# Patient Record
Sex: FEMALE | Race: WHITE | NOT HISPANIC OR LATINO | Employment: UNEMPLOYED | URBAN - METROPOLITAN AREA
[De-identification: names, ages, dates, MRNs, and addresses within clinical notes are randomized per-mention and may not be internally consistent; named-entity substitution may affect disease eponyms.]

---

## 2021-04-05 ENCOUNTER — OFFICE VISIT (OUTPATIENT)
Dept: OBSTETRICS AND GYNECOLOGY | Age: 36
End: 2021-04-05

## 2021-04-05 VITALS
DIASTOLIC BLOOD PRESSURE: 78 MMHG | HEIGHT: 66 IN | SYSTOLIC BLOOD PRESSURE: 114 MMHG | BODY MASS INDEX: 43.39 KG/M2 | WEIGHT: 270 LBS

## 2021-04-05 DIAGNOSIS — Z01.419 WELL WOMAN EXAM WITH ROUTINE GYNECOLOGICAL EXAM: Primary | ICD-10-CM

## 2021-04-05 DIAGNOSIS — Z34.80 SUPERVISION OF OTHER NORMAL PREGNANCY, ANTEPARTUM: ICD-10-CM

## 2021-04-05 PROBLEM — A60.04 HERPES, VULVAR: Status: ACTIVE | Noted: 2021-04-05

## 2021-04-05 PROBLEM — Z98.891 PREVIOUS CESAREAN SECTION: Status: ACTIVE | Noted: 2021-04-05

## 2021-04-05 PROBLEM — Z86.19 HISTORY OF WOUND INFECTION: Status: ACTIVE | Noted: 2021-04-05

## 2021-04-05 PROBLEM — Z34.90 PREGNANCY: Status: ACTIVE | Noted: 2021-04-05

## 2021-04-05 PROBLEM — E66.9 OBESITY (BMI 30-39.9): Status: ACTIVE | Noted: 2021-04-05

## 2021-04-05 PROCEDURE — 99213 OFFICE O/P EST LOW 20 MIN: CPT | Performed by: NURSE PRACTITIONER

## 2021-04-05 PROCEDURE — 99385 PREV VISIT NEW AGE 18-39: CPT | Performed by: NURSE PRACTITIONER

## 2021-04-05 NOTE — PROGRESS NOTES
[facility]  Routine Annual Visit    2021    Patient: Saima Rausch          MR#:[id number]      History of Present Illness    35 y.o. female  who presents for annual exam and confirmation of pregnancy as an old/new patient.    US confirms viable IUP at 9w3d.  This is Saima's 3rd pregnancy- hx 2 c-sections  She is taking a prenatal vitamin- no other meds  No complaints today    Patient's last menstrual period was 2021.  Obstetric History:  OB History        2    Para   2    Term   2            AB        Living   2       SAB        TAB        Ectopic        Molar        Multiple        Live Births   2               Menstrual History:     Patient's last menstrual period was 2021.       Sexual History:       ________________________________________  Patient Active Problem List   Diagnosis   • Pregnancy   • Herpes, vulvar   • History of wound infection       Past Medical History:   Diagnosis Date   • HSV-2 (herpes simplex virus 2) infection    • Wound infection        Past Surgical History:   Procedure Laterality Date   •  SECTION     • CHOLECYSTECTOMY         Social History     Tobacco Use   Smoking Status Never Smoker   Smokeless Tobacco Never Used       Family History   Problem Relation Age of Onset   • Hypertension Father    • Hypertension Sister        Prior to Admission medications    Medication Sig Start Date End Date Taking? Authorizing Provider   bisacodyl (DULCOLAX) 5 MG EC tablet Take 5 mg by mouth Daily As Needed for Constipation.    Emergency, Nurse BOBBY Robles     ________________________________________    The following portions of the patient's history were reviewed and updated as appropriate: allergies, current medications, past family history, past medical history, past social history, past surgical history and problem list.    Review of Systems   Constitutional: Negative.    HENT: Negative.    Eyes: Negative for visual disturbance.  "  Respiratory: Negative for cough, shortness of breath and wheezing.    Cardiovascular: Negative for chest pain, palpitations and leg swelling.   Gastrointestinal: Negative for abdominal distention, abdominal pain, blood in stool, constipation, diarrhea, nausea and vomiting.   Endocrine: Negative for cold intolerance and heat intolerance.   Genitourinary: Negative for difficulty urinating, dyspareunia, dysuria, frequency, genital sores, hematuria, menstrual problem, pelvic pain, urgency, vaginal bleeding, vaginal discharge and vaginal pain.   Musculoskeletal: Negative.    Skin: Negative.    Neurological: Negative for dizziness, weakness, light-headedness, numbness and headaches.   Hematological: Negative.    Psychiatric/Behavioral: Negative.    Breasts: negative for lumps skin changes, dimpling, swelling, nipple changes/discharge bilaterally       Objective   Physical Exam    /78   Ht 167.6 cm (66\")   Wt 122 kg (270 lb)   LMP 01/29/2021   Breastfeeding No   BMI 43.58 kg/m²    BP Readings from Last 3 Encounters:   04/05/21 114/78   10/02/17 133/86      Wt Readings from Last 3 Encounters:   04/05/21 122 kg (270 lb)   10/02/17 118 kg (260 lb)        BMI: Estimated body mass index is 43.58 kg/m² as calculated from the following:    Height as of this encounter: 167.6 cm (66\").    Weight as of this encounter: 122 kg (270 lb).            General:   alert, appears stated age and cooperative   Heart: regular rate and rhythm, S1, S2 normal, no murmur, click, rub or gallop   Lungs: clear to auscultation bilaterally   Abdomen: soft, non-tender, without masses or organomegaly   Breast: inspection negative, no nipple discharge or bleeding, no masses or nodularity palpable   Vulva: External genitalia including bartholin's glands, Urethra, Drav[place]'s gland and urethra meatus are normal, Perineum, rectum and anus appear normal  and Bladder appears normal without significant prolapse    Vagina: normal mucosa, normal discharge "   Cervix: no cervical motion tenderness and no lesions   Uterus: deferred   Adnexa: deferred     Assessment:     Diagnoses and all orders for this visit:    1. Well woman exam with routine gynecological exam (Primary)  -     ABO / Rh  -     Antibody Screen  -     CBC (No Diff)  -     Hemoglobin A1c  -     Hemoglobinopathy Fractionation Cascade  -     Hepatitis B Surface Antigen  -     Varicella Zoster Antibody, IgG  -     Urine Culture - Urine, Urine, Clean Catch  -     Rubella Antibody, IgG  -     RPR  -     HIV-1 / O / 2 Ag / Antibody 4th Generation  -     Hepatitis C Antibody  -     TSH Rfx On Abnormal To Free T4  -     IgP, Aptima HPV  -     NuSwab VG+ - Swab, Vagina    2. Supervision of other normal pregnancy, antepartum  -     IgP, Aptima HPV  -     NuSwab VG+ - Swab, Vagina      Early pregnancy counseling provided   Patient is taking Prenatal vitamins  Problem list reviewed and updated  Reviewed routine prenatal care with the office to include but not limited to expected weight gain during pregnancy, Tylenol products are fine, avoid aspirin and ibuprofen; Zika (travel restrictions/ok to use insect repellant); not to change cat litter; food restrictions; avoidance of alcohol, tobacco, drugs and saunas/hot tubs.   All questions answered.  SAB warnings   Counseled on healthy lifestyle modifications, safe sex, condom use, and self breast exams.    RTO 4 weeks for OB intake    [place]iana Avila, APRN  4/5/2021 15:23 EDT

## 2021-04-07 LAB
BACTERIA UR CULT: NORMAL
BACTERIA UR CULT: NORMAL
CYTOLOGIST CVX/VAG CYTO: NORMAL
CYTOLOGY CVX/VAG DOC CYTO: NORMAL
CYTOLOGY CVX/VAG DOC THIN PREP: NORMAL
DX ICD CODE: NORMAL
HIV 1 & 2 AB SER-IMP: NORMAL
HPV I/H RISK 4 DNA CVX QL PROBE+SIG AMP: NEGATIVE
OTHER STN SPEC: NORMAL
STAT OF ADQ CVX/VAG CYTO-IMP: NORMAL

## 2021-04-08 LAB
A VAGINAE DNA VAG QL NAA+PROBE: NORMAL SCORE
BVAB2 DNA VAG QL NAA+PROBE: NORMAL SCORE
C ALBICANS DNA VAG QL NAA+PROBE: NEGATIVE
C GLABRATA DNA VAG QL NAA+PROBE: NEGATIVE
C TRACH DNA VAG QL NAA+PROBE: NEGATIVE
MEGA1 DNA VAG QL NAA+PROBE: NORMAL SCORE
N GONORRHOEA DNA VAG QL NAA+PROBE: NEGATIVE
T VAGINALIS DNA VAG QL NAA+PROBE: NEGATIVE

## 2021-04-09 LAB
ABO GROUP BLD: NORMAL
BLD GP AB SCN SERPL QL: NEGATIVE
ERYTHROCYTE [DISTWIDTH] IN BLOOD BY AUTOMATED COUNT: 14.6 % (ref 11.7–15.4)
HBA1C MFR BLD: 5.5 % (ref 4.8–5.6)
HBV SURFACE AG SERPL QL IA: NEGATIVE
HCT VFR BLD AUTO: 31.5 % (ref 34–46.6)
HCV AB S/CO SERPL IA: <0.1 S/CO RATIO (ref 0–0.9)
HGB A MFR BLD ELPH: 97.5 % (ref 96.4–98.8)
HGB A2 MFR BLD ELPH: 2.5 % (ref 1.8–3.2)
HGB BLD-MCNC: 10.5 G/DL (ref 11.1–15.9)
HGB F MFR BLD ELPH: 0 % (ref 0–2)
HGB FRACT BLD-IMP: NORMAL
HGB S MFR BLD ELPH: 0 %
HIV 1+2 AB+HIV1 P24 AG SERPL QL IA: NON REACTIVE
MCH RBC QN AUTO: 27.3 PG (ref 26.6–33)
MCHC RBC AUTO-ENTMCNC: 33.3 G/DL (ref 31.5–35.7)
MCV RBC AUTO: 82 FL (ref 79–97)
PLATELET # BLD AUTO: 304 X10E3/UL (ref 150–450)
RBC # BLD AUTO: 3.84 X10E6/UL (ref 3.77–5.28)
RH BLD: POSITIVE
RPR SER QL: NON REACTIVE
RUBV IGG SERPL IA-ACNC: 4.94 INDEX
TSH SERPL DL<=0.005 MIU/L-ACNC: 1.79 UIU/ML (ref 0.45–4.5)
VZV IGG SER IA-ACNC: 1102 INDEX
WBC # BLD AUTO: 7.3 X10E3/UL (ref 3.4–10.8)

## 2021-04-13 PROBLEM — O99.019 MATERNAL ANEMIA IN PREGNANCY, ANTEPARTUM: Status: ACTIVE | Noted: 2021-04-13

## 2021-04-13 RX ORDER — FERROUS SULFATE 325(65) MG
325 TABLET ORAL
Qty: 30 TABLET | Refills: 3 | Status: SHIPPED | OUTPATIENT
Start: 2021-04-13 | End: 2021-09-27 | Stop reason: SDUPTHER

## 2021-04-16 ENCOUNTER — BULK ORDERING (OUTPATIENT)
Dept: CASE MANAGEMENT | Facility: OTHER | Age: 36
End: 2021-04-16

## 2021-04-16 DIAGNOSIS — Z23 IMMUNIZATION DUE: ICD-10-CM

## 2021-05-05 ENCOUNTER — INITIAL PRENATAL (OUTPATIENT)
Dept: OBSTETRICS AND GYNECOLOGY | Age: 36
End: 2021-05-05

## 2021-05-05 VITALS — SYSTOLIC BLOOD PRESSURE: 124 MMHG | DIASTOLIC BLOOD PRESSURE: 72 MMHG | WEIGHT: 266 LBS | BODY MASS INDEX: 42.93 KG/M2

## 2021-05-05 DIAGNOSIS — Z98.891 PREVIOUS CESAREAN SECTION: ICD-10-CM

## 2021-05-05 DIAGNOSIS — Z3A.13 13 WEEKS GESTATION OF PREGNANCY: Primary | ICD-10-CM

## 2021-05-05 DIAGNOSIS — Z13.79 ENCOUNTER FOR GENETIC SCREENING FOR DOWN SYNDROME: ICD-10-CM

## 2021-05-05 DIAGNOSIS — O99.210 OBESITY IN PREGNANCY, ANTEPARTUM: ICD-10-CM

## 2021-05-05 DIAGNOSIS — Z13.89 SCREENING FOR BLOOD OR PROTEIN IN URINE: ICD-10-CM

## 2021-05-05 PROBLEM — E66.9 OBESITY (BMI 30-39.9): Status: RESOLVED | Noted: 2021-04-05 | Resolved: 2021-05-05

## 2021-05-05 LAB
BILIRUB BLD-MCNC: NEGATIVE MG/DL
EXTERNAL CYSTIC FIBROSIS: NEGATIVE
EXTERNAL NIPT: NORMAL
GLUCOSE UR STRIP-MCNC: NEGATIVE MG/DL
KETONES UR QL: NEGATIVE
LEUKOCYTE EST, POC: NEGATIVE
NITRITE UR-MCNC: NEGATIVE MG/ML
PH UR: 6 [PH] (ref 5–8)
PROT UR STRIP-MCNC: NEGATIVE MG/DL
RBC # UR STRIP: NEGATIVE /UL
SP GR UR: 1.02 (ref 1–1.03)
UROBILINOGEN UR QL: NORMAL

## 2021-05-05 PROCEDURE — 99213 OFFICE O/P EST LOW 20 MIN: CPT | Performed by: OBSTETRICS & GYNECOLOGY

## 2021-05-05 RX ORDER — PRENATAL VIT/IRON FUM/FOLIC AC 27MG-0.8MG
TABLET ORAL DAILY
COMMUNITY

## 2021-05-05 NOTE — PROGRESS NOTES
Chief Complaint   Patient presents with   • Routine Prenatal Visit     no cc; genetic testing        HPI: 35 y.o.  at 13w5d presents for routine OB check feeling well without complaints.  The patient desires to proceed with noninvasive prenatal testing.  The patient's had 2 prior  section and is asking about .  Patient is advised to practice does not recommend that.    Vitals:    21 1433   BP: 124/72   Weight: 121 kg (266 lb)       Review of systems:     Gen: negative  CV:     negative  GI: negative  :   negative  MS:    negative  Neuro: negative and denies headaches and visual changes   Pul: negative      A/P  1. Intrauterine pregnancy at 13w5d   2. Pregnancy Risk:  NORMAL        Diagnoses and all orders for this visit:    1. 13 weeks gestation of pregnancy (Primary)    2. Screening for blood or protein in urine  -     POC Urinalysis Dipstick    3. Previous  section    4. Obesity in pregnancy, antepartum    5. Encounter for genetic screening for Down Syndrome        Nutrition and weight gain were addressed.      -----------------------  PLAN:   Return in about 4 weeks (around 2021) for Anatomic survey and OB check.      Abdulkadir Thompson MD  2021 15:08 EDT

## 2021-05-17 PROBLEM — Z13.79 GENETIC SCREENING: Status: ACTIVE | Noted: 2021-05-17

## 2021-06-01 LAB — EXTERNAL NIPT: NORMAL

## 2021-06-10 ENCOUNTER — ROUTINE PRENATAL (OUTPATIENT)
Dept: OBSTETRICS AND GYNECOLOGY | Age: 36
End: 2021-06-10

## 2021-06-10 VITALS — WEIGHT: 265 LBS | BODY MASS INDEX: 42.77 KG/M2 | SYSTOLIC BLOOD PRESSURE: 110 MMHG | DIASTOLIC BLOOD PRESSURE: 72 MMHG

## 2021-06-10 DIAGNOSIS — Z13.89 SCREENING FOR HEMATURIA OR PROTEINURIA: ICD-10-CM

## 2021-06-10 DIAGNOSIS — Z36.89 ENCOUNTER FOR FETAL ANATOMIC SURVEY: ICD-10-CM

## 2021-06-10 DIAGNOSIS — Z3A.18 18 WEEKS GESTATION OF PREGNANCY: Primary | ICD-10-CM

## 2021-06-10 LAB
GLUCOSE UR STRIP-MCNC: NEGATIVE MG/DL
PROT UR STRIP-MCNC: NEGATIVE MG/DL

## 2021-06-10 PROCEDURE — 99213 OFFICE O/P EST LOW 20 MIN: CPT | Performed by: NURSE PRACTITIONER

## 2021-06-10 NOTE — PROGRESS NOTES
Chief Complaint   Patient presents with   • Routine Prenatal Visit     18w6d, no complaints        HPI: 35 y.o.  at 18w6d presents for routine OB visit without complaint    Anatomy scan today limited - repeat 4 weeks. CL 4.3 cm.  Counseled regarding AFP - declines    Vitals:    06/10/21 1012   BP: 110/72   Weight: 120 kg (265 lb)       Review of systems:     Gen: negative  CV:     negative  GI: negative  :   negative  MS:    negative  Neuro: negative  Pul: negative      A/P  1. Intrauterine pregnancy at 18w6d   2. Pregnancy Risk:  COMPLICATED        Diagnoses and all orders for this visit:    1. 18 weeks gestation of pregnancy (Primary)    2. Screening for hematuria or proteinuria  -     POC Urinalysis Dipstick    3. Encounter for fetal anatomic survey      ----------------------  PLAN:     RTO 4 weeks for repeat anatomy scan and OB visit  Call for changes or concerns         [place]iana Avila, BONG  6/10/2021 10:16 EDT

## 2021-07-07 ENCOUNTER — ROUTINE PRENATAL (OUTPATIENT)
Dept: OBSTETRICS AND GYNECOLOGY | Age: 36
End: 2021-07-07

## 2021-07-07 VITALS — WEIGHT: 269.6 LBS | SYSTOLIC BLOOD PRESSURE: 120 MMHG | BODY MASS INDEX: 43.51 KG/M2 | DIASTOLIC BLOOD PRESSURE: 82 MMHG

## 2021-07-07 DIAGNOSIS — Z13.89 SCREENING FOR HEMATURIA OR PROTEINURIA: ICD-10-CM

## 2021-07-07 DIAGNOSIS — Z36.89 ENCOUNTER FOR FETAL ANATOMIC SURVEY: ICD-10-CM

## 2021-07-07 DIAGNOSIS — Z3A.22 22 WEEKS GESTATION OF PREGNANCY: Primary | ICD-10-CM

## 2021-07-07 LAB
BILIRUB BLD-MCNC: NEGATIVE MG/DL
CLARITY, POC: CLEAR
COLOR UR: YELLOW
GLUCOSE UR STRIP-MCNC: NEGATIVE MG/DL
KETONES UR QL: NEGATIVE
LEUKOCYTE EST, POC: ABNORMAL
NITRITE UR-MCNC: NEGATIVE MG/ML
PH UR: 7 [PH] (ref 5–8)
PROT UR STRIP-MCNC: NEGATIVE MG/DL
RBC # UR STRIP: NEGATIVE /UL
SP GR UR: 1.01 (ref 1–1.03)
UROBILINOGEN UR QL: NORMAL

## 2021-07-07 PROCEDURE — 99213 OFFICE O/P EST LOW 20 MIN: CPT | Performed by: NURSE PRACTITIONER

## 2021-07-07 NOTE — PROGRESS NOTES
Chief Complaint   Patient presents with   • Routine Prenatal Visit     22w5d ob check with anatomy US today. No complaints today       HPI: 35 y.o.  at 22w5d presents for routine OB visit without complaint. Anatomy normal but incomplete today- will repeat 4 weeks. Growth normal- EFW 55%.     Vitals:    21 0901   BP: 120/82   Weight: 122 kg (269 lb 9.6 oz)       Review of systems:     Gen: negative  CV:     negative  GI: negative  :   good fetal movement noted   MS:    negative  Neuro: negative  Pul: negative      A/P  1. Intrauterine pregnancy at 22w5d   2. Pregnancy Risk:  NORMAL        Diagnoses and all orders for this visit:    1. 22 weeks gestation of pregnancy (Primary)    2. Screening for hematuria or proteinuria  -     POC Urinalysis Dipstick    3. Encounter for fetal anatomic survey        Nutrition and weight gain were addressed.      -----------------------  PLAN:   4 weeks repeat anatomy scan, 1 hr glucose, and OB visit  Call for changes or concerns    [place] Avila, APRN  2021 09:09 EDT

## 2021-08-08 ENCOUNTER — HOSPITAL ENCOUNTER (EMERGENCY)
Facility: HOSPITAL | Age: 36
Discharge: HOME | End: 2021-08-08
Attending: OBSTETRICS & GYNECOLOGY | Admitting: OBSTETRICS & GYNECOLOGY

## 2021-08-08 ENCOUNTER — APPOINTMENT (OUTPATIENT)
Dept: GENERAL RADIOLOGY | Facility: HOSPITAL | Age: 36
End: 2021-08-08

## 2021-08-08 VITALS
SYSTOLIC BLOOD PRESSURE: 114 MMHG | DIASTOLIC BLOOD PRESSURE: 74 MMHG | OXYGEN SATURATION: 98 % | HEIGHT: 66 IN | WEIGHT: 263.6 LBS | TEMPERATURE: 97.1 F | HEART RATE: 88 BPM | RESPIRATION RATE: 15 BRPM | BODY MASS INDEX: 42.36 KG/M2

## 2021-08-08 DIAGNOSIS — Z3A.27 27 WEEKS GESTATION OF PREGNANCY: ICD-10-CM

## 2021-08-08 DIAGNOSIS — R82.90 ABNORMAL URINALYSIS: ICD-10-CM

## 2021-08-08 DIAGNOSIS — U07.1 COVID-19: Primary | ICD-10-CM

## 2021-08-08 DIAGNOSIS — R06.00 DYSPNEA, UNSPECIFIED TYPE: ICD-10-CM

## 2021-08-08 LAB
ALBUMIN SERPL-MCNC: 3.4 G/DL (ref 3.5–5.2)
ALBUMIN/GLOB SERPL: 1 G/DL
ALP SERPL-CCNC: 196 U/L (ref 39–117)
ALT SERPL W P-5'-P-CCNC: 23 U/L (ref 1–33)
ANION GAP SERPL CALCULATED.3IONS-SCNC: 14.6 MMOL/L (ref 5–15)
AST SERPL-CCNC: 19 U/L (ref 1–32)
B PARAPERT DNA SPEC QL NAA+PROBE: NOT DETECTED
B PERT DNA SPEC QL NAA+PROBE: NOT DETECTED
BACTERIA UR QL AUTO: ABNORMAL /HPF
BASOPHILS # BLD AUTO: 0.01 10*3/MM3 (ref 0–0.2)
BASOPHILS NFR BLD AUTO: 0.2 % (ref 0–1.5)
BILIRUB SERPL-MCNC: 0.3 MG/DL (ref 0–1.2)
BILIRUB UR QL STRIP: NEGATIVE
BUN SERPL-MCNC: 5 MG/DL (ref 6–20)
BUN/CREAT SERPL: 8.8 (ref 7–25)
C PNEUM DNA NPH QL NAA+NON-PROBE: NOT DETECTED
CALCIUM SPEC-SCNC: 8.1 MG/DL (ref 8.6–10.5)
CHLORIDE SERPL-SCNC: 104 MMOL/L (ref 98–107)
CLARITY UR: ABNORMAL
CO2 SERPL-SCNC: 19.4 MMOL/L (ref 22–29)
COLOR UR: ABNORMAL
CREAT SERPL-MCNC: 0.57 MG/DL (ref 0.57–1)
DEPRECATED RDW RBC AUTO: 45.2 FL (ref 37–54)
EOSINOPHIL # BLD AUTO: 0.01 10*3/MM3 (ref 0–0.4)
EOSINOPHIL NFR BLD AUTO: 0.2 % (ref 0.3–6.2)
ERYTHROCYTE [DISTWIDTH] IN BLOOD BY AUTOMATED COUNT: 14.1 % (ref 12.3–15.4)
FLUAV SUBTYP SPEC NAA+PROBE: NOT DETECTED
FLUBV RNA ISLT QL NAA+PROBE: NOT DETECTED
GFR SERPL CREATININE-BSD FRML MDRD: 122 ML/MIN/1.73
GLOBULIN UR ELPH-MCNC: 3.3 GM/DL
GLUCOSE SERPL-MCNC: 75 MG/DL (ref 65–99)
GLUCOSE UR STRIP-MCNC: NEGATIVE MG/DL
HADV DNA SPEC NAA+PROBE: NOT DETECTED
HCOV 229E RNA SPEC QL NAA+PROBE: NOT DETECTED
HCOV HKU1 RNA SPEC QL NAA+PROBE: NOT DETECTED
HCOV NL63 RNA SPEC QL NAA+PROBE: NOT DETECTED
HCOV OC43 RNA SPEC QL NAA+PROBE: NOT DETECTED
HCT VFR BLD AUTO: 36.7 % (ref 34–46.6)
HGB BLD-MCNC: 12.3 G/DL (ref 12–15.9)
HGB UR QL STRIP.AUTO: NEGATIVE
HMPV RNA NPH QL NAA+NON-PROBE: NOT DETECTED
HPIV1 RNA SPEC QL NAA+PROBE: NOT DETECTED
HPIV2 RNA SPEC QL NAA+PROBE: NOT DETECTED
HPIV3 RNA NPH QL NAA+PROBE: NOT DETECTED
HPIV4 P GENE NPH QL NAA+PROBE: NOT DETECTED
HYALINE CASTS UR QL AUTO: ABNORMAL /LPF
IMM GRANULOCYTES # BLD AUTO: 0.02 10*3/MM3 (ref 0–0.05)
IMM GRANULOCYTES NFR BLD AUTO: 0.4 % (ref 0–0.5)
KETONES UR QL STRIP: ABNORMAL
LEUKOCYTE ESTERASE UR QL STRIP.AUTO: ABNORMAL
LYMPHOCYTES # BLD AUTO: 1.08 10*3/MM3 (ref 0.7–3.1)
LYMPHOCYTES NFR BLD AUTO: 19.3 % (ref 19.6–45.3)
M PNEUMO IGG SER IA-ACNC: NOT DETECTED
MAGNESIUM SERPL-MCNC: 2 MG/DL (ref 1.6–2.6)
MCH RBC QN AUTO: 29.4 PG (ref 26.6–33)
MCHC RBC AUTO-ENTMCNC: 33.5 G/DL (ref 31.5–35.7)
MCV RBC AUTO: 87.8 FL (ref 79–97)
MONOCYTES # BLD AUTO: 0.23 10*3/MM3 (ref 0.1–0.9)
MONOCYTES NFR BLD AUTO: 4.1 % (ref 5–12)
NEUTROPHILS NFR BLD AUTO: 4.25 10*3/MM3 (ref 1.7–7)
NEUTROPHILS NFR BLD AUTO: 75.8 % (ref 42.7–76)
NITRITE UR QL STRIP: NEGATIVE
NRBC BLD AUTO-RTO: 0 /100 WBC (ref 0–0.2)
NT-PROBNP SERPL-MCNC: 35.1 PG/ML (ref 0–450)
PH UR STRIP.AUTO: 6.5 [PH] (ref 5–8)
PLATELET # BLD AUTO: 153 10*3/MM3 (ref 140–450)
PMV BLD AUTO: 9.6 FL (ref 6–12)
POTASSIUM SERPL-SCNC: 3.3 MMOL/L (ref 3.5–5.2)
PROCALCITONIN SERPL-MCNC: 0.05 NG/ML (ref 0–0.25)
PROT SERPL-MCNC: 6.7 G/DL (ref 6–8.5)
PROT UR QL STRIP: ABNORMAL
QT INTERVAL: 367 MS
RBC # BLD AUTO: 4.18 10*6/MM3 (ref 3.77–5.28)
RBC # UR: ABNORMAL /HPF
REF LAB TEST METHOD: ABNORMAL
RHINOVIRUS RNA SPEC NAA+PROBE: NOT DETECTED
RSV RNA NPH QL NAA+NON-PROBE: NOT DETECTED
SARS-COV-2 RNA NPH QL NAA+NON-PROBE: DETECTED
SODIUM SERPL-SCNC: 138 MMOL/L (ref 136–145)
SP GR UR STRIP: 1.03 (ref 1–1.03)
SQUAMOUS #/AREA URNS HPF: ABNORMAL /HPF
TROPONIN T SERPL-MCNC: <0.01 NG/ML (ref 0–0.03)
UROBILINOGEN UR QL STRIP: ABNORMAL
WBC # BLD AUTO: 5.6 10*3/MM3 (ref 3.4–10.8)
WBC UR QL AUTO: ABNORMAL /HPF

## 2021-08-08 PROCEDURE — 83735 ASSAY OF MAGNESIUM: CPT | Performed by: EMERGENCY MEDICINE

## 2021-08-08 PROCEDURE — 0202U NFCT DS 22 TRGT SARS-COV-2: CPT | Performed by: EMERGENCY MEDICINE

## 2021-08-08 PROCEDURE — 81001 URINALYSIS AUTO W/SCOPE: CPT | Performed by: OBSTETRICS & GYNECOLOGY

## 2021-08-08 PROCEDURE — 93005 ELECTROCARDIOGRAM TRACING: CPT | Performed by: EMERGENCY MEDICINE

## 2021-08-08 PROCEDURE — 71045 X-RAY EXAM CHEST 1 VIEW: CPT

## 2021-08-08 PROCEDURE — 87086 URINE CULTURE/COLONY COUNT: CPT | Performed by: OBSTETRICS & GYNECOLOGY

## 2021-08-08 PROCEDURE — 84145 PROCALCITONIN (PCT): CPT | Performed by: EMERGENCY MEDICINE

## 2021-08-08 PROCEDURE — 59025 FETAL NON-STRESS TEST: CPT

## 2021-08-08 PROCEDURE — 99283 EMERGENCY DEPT VISIT LOW MDM: CPT | Performed by: OBSTETRICS & GYNECOLOGY

## 2021-08-08 PROCEDURE — 85025 COMPLETE CBC W/AUTO DIFF WBC: CPT | Performed by: EMERGENCY MEDICINE

## 2021-08-08 PROCEDURE — 36415 COLL VENOUS BLD VENIPUNCTURE: CPT

## 2021-08-08 PROCEDURE — 83880 ASSAY OF NATRIURETIC PEPTIDE: CPT | Performed by: EMERGENCY MEDICINE

## 2021-08-08 PROCEDURE — 80053 COMPREHEN METABOLIC PANEL: CPT | Performed by: EMERGENCY MEDICINE

## 2021-08-08 PROCEDURE — 93010 ELECTROCARDIOGRAM REPORT: CPT | Performed by: INTERNAL MEDICINE

## 2021-08-08 PROCEDURE — 84484 ASSAY OF TROPONIN QUANT: CPT | Performed by: EMERGENCY MEDICINE

## 2021-08-08 PROCEDURE — 99202 OFFICE O/P NEW SF 15 MIN: CPT | Performed by: OBSTETRICS & GYNECOLOGY

## 2021-08-08 PROCEDURE — 99284 EMERGENCY DEPT VISIT MOD MDM: CPT

## 2021-08-08 RX ORDER — NITROFURANTOIN 25; 75 MG/1; MG/1
100 CAPSULE ORAL ONCE
Status: COMPLETED | OUTPATIENT
Start: 2021-08-08 | End: 2021-08-08

## 2021-08-08 RX ORDER — POTASSIUM CHLORIDE 750 MG/1
40 TABLET, FILM COATED, EXTENDED RELEASE ORAL ONCE
Status: COMPLETED | OUTPATIENT
Start: 2021-08-08 | End: 2021-08-08

## 2021-08-08 RX ADMIN — POTASSIUM CHLORIDE 40 MEQ: 750 TABLET, EXTENDED RELEASE ORAL at 15:45

## 2021-08-08 RX ADMIN — NITROFURANTOIN MONOHYDRATE/MACROCRYSTALLINE 100 MG: 25; 75 CAPSULE ORAL at 15:45

## 2021-08-09 LAB — BACTERIA SPEC AEROBE CULT: NORMAL

## 2021-08-23 ENCOUNTER — ROUTINE PRENATAL (OUTPATIENT)
Dept: OBSTETRICS AND GYNECOLOGY | Age: 36
End: 2021-08-23

## 2021-08-23 VITALS — BODY MASS INDEX: 44.55 KG/M2 | SYSTOLIC BLOOD PRESSURE: 118 MMHG | WEIGHT: 276 LBS | DIASTOLIC BLOOD PRESSURE: 74 MMHG

## 2021-08-23 DIAGNOSIS — Z36.89 ENCOUNTER FOR FETAL ANATOMIC SURVEY: Primary | ICD-10-CM

## 2021-08-23 DIAGNOSIS — O99.019 MATERNAL ANEMIA IN PREGNANCY, ANTEPARTUM: ICD-10-CM

## 2021-08-23 DIAGNOSIS — Z13.89 SCREENING FOR BLOOD OR PROTEIN IN URINE: Primary | ICD-10-CM

## 2021-08-23 DIAGNOSIS — Z98.891 PREVIOUS CESAREAN SECTION: ICD-10-CM

## 2021-08-23 DIAGNOSIS — Z36.89 ENCOUNTER FOR FETAL ANATOMIC SURVEY: ICD-10-CM

## 2021-08-23 DIAGNOSIS — Z13.1 SCREENING FOR DIABETES MELLITUS: ICD-10-CM

## 2021-08-23 DIAGNOSIS — Z23 NEED FOR TDAP VACCINATION: ICD-10-CM

## 2021-08-23 DIAGNOSIS — A60.04 HERPES, VULVAR: ICD-10-CM

## 2021-08-23 DIAGNOSIS — Z3A.29 29 WEEKS GESTATION OF PREGNANCY: ICD-10-CM

## 2021-08-23 DIAGNOSIS — O99.210 OBESITY IN PREGNANCY, ANTEPARTUM: ICD-10-CM

## 2021-08-23 PROBLEM — O98.513 COVID-19 AFFECTING PREGNANCY IN THIRD TRIMESTER: Status: ACTIVE | Noted: 2021-08-08

## 2021-08-23 PROBLEM — Z30.2 REQUEST FOR STERILIZATION: Status: ACTIVE | Noted: 2021-08-23

## 2021-08-23 PROBLEM — U07.1 COVID-19 AFFECTING PREGNANCY IN THIRD TRIMESTER: Status: ACTIVE | Noted: 2021-08-08

## 2021-08-23 LAB
BILIRUB BLD-MCNC: NEGATIVE MG/DL
CLARITY, POC: CLEAR
COLOR UR: YELLOW
GLUCOSE UR STRIP-MCNC: NEGATIVE MG/DL
KETONES UR QL: NEGATIVE
LEUKOCYTE EST, POC: NEGATIVE
NITRITE UR-MCNC: NEGATIVE MG/ML
PH UR: 6.5 [PH] (ref 5–8)
PROT UR STRIP-MCNC: NEGATIVE MG/DL
RBC # UR STRIP: NEGATIVE /UL
SP GR UR: 1 (ref 1–1.03)
UROBILINOGEN UR QL: NORMAL

## 2021-08-23 PROCEDURE — 90471 IMMUNIZATION ADMIN: CPT | Performed by: PHYSICIAN ASSISTANT

## 2021-08-23 PROCEDURE — 99214 OFFICE O/P EST MOD 30 MIN: CPT | Performed by: PHYSICIAN ASSISTANT

## 2021-08-23 PROCEDURE — 90715 TDAP VACCINE 7 YRS/> IM: CPT | Performed by: PHYSICIAN ASSISTANT

## 2021-08-23 NOTE — PROGRESS NOTES
Chief Complaint   Patient presents with   • Routine Prenatal Visit     one hour gtt/repeat anatomy       HPI: 35 y.o.  at 29w3d gestation  She is here for rpt anatomy scan and 1 hour gtt  She was seen recently in ER and diagnosed with Covid (21)  She is feeling fine now, has no c/o other then urinary frequency  Has good FM  Additional anatomy scan done today, however, suboptimal view of 4 chamber heart noted  Will check with Dr Thompson in regards to additional imaging and if referral is warranted for the additional imaging  tdap given today  Pt interested in TL, will sign PW (surgery scheduler not in office, may need to sign at next visit)      Vitals:    21 1010   BP: 118/74   Weight: 125 kg (276 lb)       ROS:  GI:  Negative  : na  Pulmonary: Negative     A/P  1. Intrauterine pregnancy at 29w3d   2. Pregnancy Risk:  COMPLICATED    Diagnoses and all orders for this visit:    1. Screening for blood or protein in urine (Primary)  -     POC Urinalysis Dipstick    2. 29 weeks gestation of pregnancy  -     POC Urinalysis Dipstick  -     Gestational Screen 1 Hr ([facility])  -     Hemoglobin & Hematocrit, Blood    3. Screening for diabetes mellitus  -     Gestational Screen 1 Hr ([facility])  -     Hemoglobin & Hematocrit, Blood    4. Previous  section    5. Obesity in pregnancy, antepartum    6. Need for Tdap vaccination    Other orders  -     Tdap Vaccine Greater Than or Equal To 6yo IM        -----------------------  PLAN:   Return in about 3 weeks (around 2021) for belly check.      DELIA Álvarez  2021 10:37 EDT

## 2021-08-24 ENCOUNTER — TELEPHONE (OUTPATIENT)
Dept: OBSTETRICS AND GYNECOLOGY | Age: 36
End: 2021-08-24

## 2021-08-24 DIAGNOSIS — O99.810 ABNORMAL GLUCOSE AFFECTING PREGNANCY: Primary | ICD-10-CM

## 2021-08-24 LAB
GLUCOSE 1H P 50 G GLC PO SERPL-MCNC: 145 MG/DL (ref 65–139)
HCT VFR BLD AUTO: 31.8 % (ref 34–46.6)
HGB BLD-MCNC: 10.5 G/DL (ref 11.1–15.9)

## 2021-08-24 NOTE — TELEPHONE ENCOUNTER
----- Message from DELIA Darby sent at 8/24/2021 12:05 PM EDT -----  Pt needs a 3 hour gtt. She is also anemic, would suggest adding an iron supplement and increasing dietary intake of iron rich foods as well (beans, lean red meat and green, leafy vegetables)

## 2021-08-30 ENCOUNTER — LAB (OUTPATIENT)
Dept: OBSTETRICS AND GYNECOLOGY | Age: 36
End: 2021-08-30

## 2021-08-31 LAB
GLUCOSE 1H P 100 G GLC PO SERPL-MCNC: 146 MG/DL (ref 65–179)
GLUCOSE 2H P 100 G GLC PO SERPL-MCNC: 111 MG/DL (ref 65–154)
GLUCOSE 3H P 100 G GLC PO SERPL-MCNC: 109 MG/DL (ref 65–139)
GLUCOSE P FAST SERPL-MCNC: 83 MG/DL (ref 65–94)
Lab: NORMAL

## 2021-09-02 ENCOUNTER — OFFICE VISIT (OUTPATIENT)
Dept: OBSTETRICS AND GYNECOLOGY | Facility: CLINIC | Age: 36
End: 2021-09-02

## 2021-09-02 ENCOUNTER — HOSPITAL ENCOUNTER (OUTPATIENT)
Dept: ULTRASOUND IMAGING | Facility: HOSPITAL | Age: 36
Discharge: HOME | End: 2021-09-02
Admitting: PHYSICIAN ASSISTANT

## 2021-09-02 VITALS
BODY MASS INDEX: 43.71 KG/M2 | SYSTOLIC BLOOD PRESSURE: 123 MMHG | HEART RATE: 97 BPM | WEIGHT: 272 LBS | TEMPERATURE: 97.5 F | HEIGHT: 66 IN | DIASTOLIC BLOOD PRESSURE: 79 MMHG

## 2021-09-02 DIAGNOSIS — O99.210 OBESITY IN PREGNANCY, ANTEPARTUM: Primary | ICD-10-CM

## 2021-09-02 PROCEDURE — 76811 OB US DETAILED SNGL FETUS: CPT | Performed by: OBSTETRICS & GYNECOLOGY

## 2021-09-02 PROCEDURE — 76811 OB US DETAILED SNGL FETUS: CPT

## 2021-09-02 NOTE — PROGRESS NOTES
"Anatomy    Saima Rausch is a 35 y.o.   at 30w6d who presents for anatomy screening.  OB History    Para Term  AB Living   3 2 2     2   SAB TAB Ectopic Molar Multiple Live Births             2      # Outcome Date GA Lbr Ronnie/2nd Weight Sex Delivery Anes PTL Lv   3 Current            2 Term 10/09/14 39w0d  3130 g (6 lb 14.4 oz) M CS-LTranv Spinal  NELSON   1 Term 2013 38w6d  3260 g (7 lb 3 oz) F CS-LTranv Spinal  NELSON      Obstetric Comments   2021 9w3d Dating US:  Estimated Date of Delivery: 21 based on US hb    2021 19w0d Normal but INCOMPLETE anatomic survey, male fetus, CL=4.13 cm, placenta Posterior hb    2021 22w6d normal interval growth: EFW 55% AC 56%, survey remains incomplete hb      Patient Active Problem List   Diagnosis   • Pregnancy   • Herpes, vulvar   • History of wound infection   • Previous  section   • Maternal anemia in pregnancy, antepartum   • Obesity in pregnancy, antepartum   • Genetic screening   • Encounter for fetal anatomic survey   • Request for sterilization   • COVID-19 affecting pregnancy in third trimester     PHYSICAL EXAM:  /79   Pulse 97   Temp 97.5 °F (36.4 °C)   Ht 167.6 cm (66\")   Wt 123 kg (272 lb)   LMP 2021   BMI 43.90 kg/m²   General: pleasant, alert and oriented  Abdomen: soft, non tender, gravid  Extremites: bilat lower extremities soft, non tender, no edema noted    No abnormalities seen on today's ultrasound. Appropriate growth.  Visualization of fetal anatomy was limited due to the late gestational age and maternal body habitus.  Above information explained to the patient. Questions answered.    Follow up as clinically indicated.    Again thank you for requesting a MFM consult.  If we can be of any further assistance to you, please do not hesitate to contact us.    Barbara Lujan MD  [facility]ER[facility] MEDICINE              VISIT SYNOPSIS:    ASSESSMENT/PLAN:  MS. Saima Rausch is a 36 y.o.  at " 35w0d with the following visit diagnosis    Diagnoses and all orders for this visit:    1. Obesity in pregnancy, antepartum (Primary)        The above diagnosis have been evaluated and determined to be stable    This note has been routed to Dr. Thompson     At the end of this consultation all patient questions were answered, concerns addressed, and comprehensive management plan and follow up reviewed with patient.    I spent 15 minutes caring for Saima on this date of service. This time includes time spent by me in the following activities: reviewing tests, obtaining and/or reviewing a separately obtained history, performing a medically appropriate examination and/or evaluation, counseling and educating the patient/family/caregiver and independently interpreting results and communicating that information with the patient/family/caregiver.

## 2021-09-15 ENCOUNTER — ROUTINE PRENATAL (OUTPATIENT)
Dept: OBSTETRICS AND GYNECOLOGY | Age: 36
End: 2021-09-15

## 2021-09-15 VITALS — SYSTOLIC BLOOD PRESSURE: 118 MMHG | DIASTOLIC BLOOD PRESSURE: 78 MMHG | BODY MASS INDEX: 44.55 KG/M2 | WEIGHT: 276 LBS

## 2021-09-15 DIAGNOSIS — Z13.89 SCREENING FOR BLOOD OR PROTEIN IN URINE: ICD-10-CM

## 2021-09-15 DIAGNOSIS — O99.019 MATERNAL ANEMIA IN PREGNANCY, ANTEPARTUM: ICD-10-CM

## 2021-09-15 DIAGNOSIS — Z98.891 PREVIOUS CESAREAN SECTION: ICD-10-CM

## 2021-09-15 DIAGNOSIS — Z3A.32 32 WEEKS GESTATION OF PREGNANCY: Primary | ICD-10-CM

## 2021-09-15 DIAGNOSIS — O99.210 OBESITY IN PREGNANCY, ANTEPARTUM: ICD-10-CM

## 2021-09-15 LAB
GLUCOSE UR STRIP-MCNC: NEGATIVE MG/DL
PROT UR STRIP-MCNC: NEGATIVE MG/DL

## 2021-09-15 PROCEDURE — 99212 OFFICE O/P EST SF 10 MIN: CPT | Performed by: NURSE PRACTITIONER

## 2021-09-15 NOTE — PROGRESS NOTES
Pt doing well  Was seen by MFM on 9/2 and had normal growth u/s   Reports good fetal movement  Denies LOF, bleeding or ctx's  She is taking daily iron supplement  She has no concerns or complaints today  Will be repeat C/S with BTL - consent signed last visit  PTL/FKC discussed  F/u 2 weeks

## 2021-09-27 ENCOUNTER — ROUTINE PRENATAL (OUTPATIENT)
Dept: OBSTETRICS AND GYNECOLOGY | Age: 36
End: 2021-09-27

## 2021-09-27 VITALS — DIASTOLIC BLOOD PRESSURE: 78 MMHG | SYSTOLIC BLOOD PRESSURE: 118 MMHG | WEIGHT: 278 LBS | BODY MASS INDEX: 44.87 KG/M2

## 2021-09-27 DIAGNOSIS — Z13.89 SCREENING FOR BLOOD OR PROTEIN IN URINE: ICD-10-CM

## 2021-09-27 DIAGNOSIS — Z3A.34 34 WEEKS GESTATION OF PREGNANCY: Primary | ICD-10-CM

## 2021-09-27 DIAGNOSIS — Z98.891 PREVIOUS CESAREAN SECTION: ICD-10-CM

## 2021-09-27 DIAGNOSIS — O99.019 MATERNAL ANEMIA IN PREGNANCY, ANTEPARTUM: ICD-10-CM

## 2021-09-27 DIAGNOSIS — O99.210 OBESITY IN PREGNANCY, ANTEPARTUM: ICD-10-CM

## 2021-09-27 LAB
GLUCOSE UR STRIP-MCNC: NEGATIVE MG/DL
PROT UR STRIP-MCNC: NEGATIVE MG/DL

## 2021-09-27 PROCEDURE — 99214 OFFICE O/P EST MOD 30 MIN: CPT | Performed by: NURSE PRACTITIONER

## 2021-09-27 RX ORDER — FERROUS SULFATE 325(65) MG
325 TABLET ORAL
Qty: 30 TABLET | Refills: 3 | Status: SHIPPED | OUTPATIENT
Start: 2021-09-27

## 2021-09-27 NOTE — PROGRESS NOTES
Pt doing well  Reports good fetal movement  Denies LOF, bleeding or ctx's  Having some reflux  Taking iron - needs refilled  No other c/o's  PTL/FKC discussed  F/u 1 week with Dr. Thompson

## 2021-10-04 ENCOUNTER — ROUTINE PRENATAL (OUTPATIENT)
Dept: OBSTETRICS AND GYNECOLOGY | Age: 36
End: 2021-10-04

## 2021-10-04 VITALS — WEIGHT: 280.2 LBS | DIASTOLIC BLOOD PRESSURE: 74 MMHG | SYSTOLIC BLOOD PRESSURE: 122 MMHG | BODY MASS INDEX: 45.23 KG/M2

## 2021-10-04 DIAGNOSIS — Z30.2 REQUEST FOR STERILIZATION: ICD-10-CM

## 2021-10-04 DIAGNOSIS — U07.1 COVID-19 AFFECTING PREGNANCY IN THIRD TRIMESTER: ICD-10-CM

## 2021-10-04 DIAGNOSIS — Z36.89 ENCOUNTER FOR FETAL ANATOMIC SURVEY: ICD-10-CM

## 2021-10-04 DIAGNOSIS — Z98.891 PREVIOUS CESAREAN SECTION: ICD-10-CM

## 2021-10-04 DIAGNOSIS — O99.210 OBESITY IN PREGNANCY, ANTEPARTUM: ICD-10-CM

## 2021-10-04 DIAGNOSIS — Z13.89 SCREENING FOR BLOOD OR PROTEIN IN URINE: ICD-10-CM

## 2021-10-04 DIAGNOSIS — O98.513 COVID-19 AFFECTING PREGNANCY IN THIRD TRIMESTER: ICD-10-CM

## 2021-10-04 DIAGNOSIS — Z3A.35 35 WEEKS GESTATION OF PREGNANCY: Primary | ICD-10-CM

## 2021-10-04 DIAGNOSIS — O99.019 MATERNAL ANEMIA IN PREGNANCY, ANTEPARTUM: ICD-10-CM

## 2021-10-04 LAB
BILIRUB BLD-MCNC: ABNORMAL MG/DL
CLARITY, POC: CLEAR
COLOR UR: YELLOW
GLUCOSE UR STRIP-MCNC: NEGATIVE MG/DL
KETONES UR QL: NEGATIVE
LEUKOCYTE EST, POC: NEGATIVE
NITRITE UR-MCNC: NEGATIVE MG/ML
PH UR: 6.5 [PH] (ref 5–8)
PROT UR STRIP-MCNC: ABNORMAL MG/DL
RBC # UR STRIP: NEGATIVE /UL
SP GR UR: 1.02 (ref 1–1.03)
UROBILINOGEN UR QL: ABNORMAL

## 2021-10-04 PROCEDURE — 0502F SUBSEQUENT PRENATAL CARE: CPT | Performed by: OBSTETRICS & GYNECOLOGY

## 2021-10-04 NOTE — PROGRESS NOTES
Chief Complaint   Patient presents with   • Routine Prenatal Visit     35w3d- no leakage or bleeding,good FM        HPI: 36 y.o.  at 35w3d presents for routine OB check feeling well without major complaints.  She reports the fetus is exceptionally active.  She has expected intermittent moderate low back pain from the pregnancy.    The third trimester was complicated by Covid 19 with an uneventful uncomplicated course with complete recovery    Vitals:    10/04/21 0949   BP: 122/74   Weight: 127 kg (280 lb 3.2 oz)       Review of systems:     Gen: fatigue  CV:     negative  GI: negative  :   negative and good fetal movement noted   MS:    back pain   Neuro: negative and denies headaches and visual changes   Pul: negative      A/P  1. Intrauterine pregnancy at 35w3d   2. Pregnancy Risk:  HIGH RISK        Diagnoses and all orders for this visit:    1. 35 weeks gestation of pregnancy (Primary)    2. Screening for blood or protein in urine  -     POC Urinalysis Dipstick    3. COVID-19 affecting pregnancy in third trimester    4. Incomplete anatomic survey     5. Maternal anemia in pregnancy, antepartum    6. Obesity in pregnancy, antepartum    7. Previous  section  -     Case Request    8. Request for sterilization  -     Case Request        Pre-eclampsia symptoms were discussed and warnings were given.   labor was discussed.  Warnings were provided.  Detailed instructions for  were provided      -----------------------  PLAN:   Return in about 1 week (around 10/11/2021) for ob check.      Abdulkadir Thompson MD  10/4/2021 10:07 EDT

## 2021-10-13 ENCOUNTER — ROUTINE PRENATAL (OUTPATIENT)
Dept: OBSTETRICS AND GYNECOLOGY | Age: 36
End: 2021-10-13

## 2021-10-13 VITALS — BODY MASS INDEX: 45.35 KG/M2 | WEIGHT: 281 LBS | DIASTOLIC BLOOD PRESSURE: 72 MMHG | SYSTOLIC BLOOD PRESSURE: 124 MMHG

## 2021-10-13 DIAGNOSIS — Z86.19 HISTORY OF WOUND INFECTION: ICD-10-CM

## 2021-10-13 DIAGNOSIS — Z3A.36 36 WEEKS GESTATION OF PREGNANCY: Primary | ICD-10-CM

## 2021-10-13 DIAGNOSIS — O99.210 OBESITY IN PREGNANCY, ANTEPARTUM: ICD-10-CM

## 2021-10-13 DIAGNOSIS — U07.1 COVID-19 AFFECTING PREGNANCY IN THIRD TRIMESTER: ICD-10-CM

## 2021-10-13 DIAGNOSIS — Z98.891 PREVIOUS CESAREAN SECTION: ICD-10-CM

## 2021-10-13 DIAGNOSIS — Z13.89 SCREENING FOR BLOOD OR PROTEIN IN URINE: ICD-10-CM

## 2021-10-13 DIAGNOSIS — A60.04 HERPES, VULVAR: ICD-10-CM

## 2021-10-13 DIAGNOSIS — Z36.85 ANTENATAL SCREENING FOR STREPTOCOCCUS B: ICD-10-CM

## 2021-10-13 DIAGNOSIS — O98.513 COVID-19 AFFECTING PREGNANCY IN THIRD TRIMESTER: ICD-10-CM

## 2021-10-13 LAB
BILIRUB BLD-MCNC: NEGATIVE MG/DL
CLARITY, POC: CLEAR
COLOR UR: YELLOW
GLUCOSE UR STRIP-MCNC: NEGATIVE MG/DL
KETONES UR QL: NEGATIVE
LEUKOCYTE EST, POC: ABNORMAL
NITRITE UR-MCNC: NEGATIVE MG/ML
PH UR: 7 [PH] (ref 5–8)
PROT UR STRIP-MCNC: NEGATIVE MG/DL
RBC # UR STRIP: NEGATIVE /UL
SP GR UR: 1.02 (ref 1–1.03)
UROBILINOGEN UR QL: NORMAL

## 2021-10-13 PROCEDURE — 0502F SUBSEQUENT PRENATAL CARE: CPT | Performed by: OBSTETRICS & GYNECOLOGY

## 2021-10-13 NOTE — PROGRESS NOTES
Chief Complaint   Patient presents with   • Routine Prenatal Visit     refuses flu; pt c/o abdominal tightening and fetal hiccups a lot the last few days, last night they were so strong it woke her up        HPI: 36 y.o.  at 36w5d presents for routine check with complaint of an exceptionally active fetus but often has hiccups.  She had some mild abdominal tightening that may have been contractions but the symptoms have abated.    Vitals:    10/13/21 1057   BP: 124/72   Weight: 127 kg (281 lb)     Total weight gain for pregnancy:  Not found.    Review of systems:     Gen: fatigue  CV:     negative  GI: negative  :   good fetal movement noted  and pelvic cramping  MS:    back pain   Neuro: negative and denies headaches and visual changes   Pul: negative      A/P  1. Intrauterine pregnancy at 36w5d   2. Pregnancy Risk:  HIGH RISK        Diagnoses and all orders for this visit:    1. 36 weeks gestation of pregnancy (Primary)    2. Screening for blood or protein in urine  -     POC Urinalysis Dipstick    3. Obesity in pregnancy, antepartum    4. Previous  section    5. History of wound infection    6. Herpes, vulvar    7. COVID-19 affecting pregnancy in third trimester        Pre-eclampsia symptoms were discussed and warnings were given.  Routine labor warnings were discussed and indications for L & D f/u including bleeding, regular contractions, decreased fetal movement or/and rupture of membranes.   Detailed instructions for  were provided      -----------------------  PLAN:   Return in about 1 week (around 10/20/2021) for ob check.      Abdulkadir Thompson MD  10/13/2021 11:22 EDT

## 2021-10-15 LAB — GP B STREP DNA SPEC QL NAA+PROBE: NEGATIVE

## 2021-10-21 ENCOUNTER — ROUTINE PRENATAL (OUTPATIENT)
Dept: OBSTETRICS AND GYNECOLOGY | Age: 36
End: 2021-10-21

## 2021-10-21 VITALS — SYSTOLIC BLOOD PRESSURE: 120 MMHG | WEIGHT: 284 LBS | DIASTOLIC BLOOD PRESSURE: 72 MMHG | BODY MASS INDEX: 45.84 KG/M2

## 2021-10-21 DIAGNOSIS — Z13.89 SCREENING FOR BLOOD OR PROTEIN IN URINE: ICD-10-CM

## 2021-10-21 DIAGNOSIS — O98.513 COVID-19 AFFECTING PREGNANCY IN THIRD TRIMESTER: ICD-10-CM

## 2021-10-21 DIAGNOSIS — O99.210 OBESITY IN PREGNANCY, ANTEPARTUM: ICD-10-CM

## 2021-10-21 DIAGNOSIS — O98.513 HERPES SIMPLEX VIRUS TYPE 2 (HSV-2) INFECTION AFFECTING PREGNANCY IN THIRD TRIMESTER: ICD-10-CM

## 2021-10-21 DIAGNOSIS — Z34.80 SUPERVISION OF OTHER NORMAL PREGNANCY: ICD-10-CM

## 2021-10-21 DIAGNOSIS — B00.9 HERPES SIMPLEX VIRUS TYPE 2 (HSV-2) INFECTION AFFECTING PREGNANCY IN THIRD TRIMESTER: ICD-10-CM

## 2021-10-21 DIAGNOSIS — Z98.891 PREVIOUS CESAREAN SECTION: ICD-10-CM

## 2021-10-21 DIAGNOSIS — O99.019 MATERNAL ANEMIA IN PREGNANCY, ANTEPARTUM: ICD-10-CM

## 2021-10-21 DIAGNOSIS — Z30.2 REQUEST FOR STERILIZATION: ICD-10-CM

## 2021-10-21 DIAGNOSIS — Z3A.37 37 WEEKS GESTATION OF PREGNANCY: Primary | ICD-10-CM

## 2021-10-21 DIAGNOSIS — U07.1 COVID-19 AFFECTING PREGNANCY IN THIRD TRIMESTER: ICD-10-CM

## 2021-10-21 PROCEDURE — 0502F SUBSEQUENT PRENATAL CARE: CPT | Performed by: OBSTETRICS & GYNECOLOGY

## 2021-10-21 RX ORDER — VALACYCLOVIR HYDROCHLORIDE 1 G/1
1000 TABLET, FILM COATED ORAL DAILY
Qty: 30 TABLET | Refills: 4 | Status: SHIPPED | OUTPATIENT
Start: 2021-10-21

## 2021-10-21 NOTE — PROGRESS NOTES
Chief Complaint   Patient presents with   • Routine Prenatal Visit     no cc        HPI: 36 y.o.  at 37w6d presents feeling well without major complaints.  The patient reports good fetal movement.    Vitals:    10/21/21 0758   BP: 120/72   Weight: 129 kg (284 lb)     Total weight gain for pregnancy:  10.8 kg (23 lb 13.7 oz)    Review of systems:     Gen: fatigue  CV:     negative  GI: negative  :   negative and good fetal movement noted   MS:    back pain   Neuro: negative and denies headaches and visual changes   Pul: negative      A/P  1. Intrauterine pregnancy at 37w6d   2. Pregnancy Risk:  HIGH RISK        Diagnoses and all orders for this visit:    1. 37 weeks gestation of pregnancy (Primary)    2. Screening for blood or protein in urine  -     POC Urinalysis Dipstick    3. Supervision of other normal pregnancy    4. COVID-19 affecting pregnancy in third trimester    5. Maternal anemia in pregnancy, antepartum    6. Obesity in pregnancy, antepartum    7. Previous  section    8. Request for sterilization    9. Herpes simplex virus type 2 (HSV-2) infection affecting pregnancy in third trimester  -     valACYclovir (Valtrex) 1000 MG tablet; Take 1 tablet by mouth Daily.  Dispense: 30 tablet; Refill: 4        Pre-eclampsia symptoms were discussed and warnings were given.  Routine labor warnings were discussed and indications for L & D f/u including bleeding, regular contractions, decreased fetal movement or/and rupture of membranes.   Detailed instructions for  were provided      -----------------------  PLAN:   Return in about 1 week (around 10/28/2021) for OB check and BPP.      Abdulkadir Thompson MD  10/21/2021 08:20 EDT

## 2021-10-27 ENCOUNTER — ROUTINE PRENATAL (OUTPATIENT)
Dept: OBSTETRICS AND GYNECOLOGY | Age: 36
End: 2021-10-27

## 2021-10-27 DIAGNOSIS — Z30.2 REQUEST FOR STERILIZATION: ICD-10-CM

## 2021-10-27 DIAGNOSIS — O98.513 HERPES SIMPLEX VIRUS TYPE 2 (HSV-2) INFECTION AFFECTING PREGNANCY IN THIRD TRIMESTER: ICD-10-CM

## 2021-10-27 DIAGNOSIS — Z13.89 SCREENING FOR BLOOD OR PROTEIN IN URINE: ICD-10-CM

## 2021-10-27 DIAGNOSIS — O98.513 COVID-19 AFFECTING PREGNANCY IN THIRD TRIMESTER: ICD-10-CM

## 2021-10-27 DIAGNOSIS — O99.210 OBESITY IN PREGNANCY, ANTEPARTUM: ICD-10-CM

## 2021-10-27 DIAGNOSIS — B00.9 HERPES SIMPLEX VIRUS TYPE 2 (HSV-2) INFECTION AFFECTING PREGNANCY IN THIRD TRIMESTER: ICD-10-CM

## 2021-10-27 DIAGNOSIS — Z3A.38 38 WEEKS GESTATION OF PREGNANCY: Primary | ICD-10-CM

## 2021-10-27 DIAGNOSIS — Z34.80 SUPERVISION OF OTHER NORMAL PREGNANCY: ICD-10-CM

## 2021-10-27 DIAGNOSIS — Z98.891 PREVIOUS CESAREAN SECTION: ICD-10-CM

## 2021-10-27 DIAGNOSIS — Z86.19 HISTORY OF WOUND INFECTION: ICD-10-CM

## 2021-10-27 DIAGNOSIS — O99.019 MATERNAL ANEMIA IN PREGNANCY, ANTEPARTUM: ICD-10-CM

## 2021-10-27 DIAGNOSIS — U07.1 COVID-19 AFFECTING PREGNANCY IN THIRD TRIMESTER: ICD-10-CM

## 2021-10-27 PROCEDURE — 59425 ANTEPARTUM CARE ONLY: CPT | Performed by: OBSTETRICS & GYNECOLOGY

## 2021-10-27 NOTE — PROGRESS NOTES
Chief Complaint   Patient presents with   • Routine Prenatal Visit     BPP        HPI: 36 y.o.  at 38w5d presents for routine OB check without major complaints.  The pregnancy has been complicated by Covid in the third trimester and morbid obesity.  Interval growth is normal on ultrasound today and biophysical profile is reassuring .  The patient is taking her prophylaxis for HSV.   is scheduled for Saturday    Vitals:    10/27/21 1021   BP: 120/70   Weight: 127 kg (281 lb)     Total weight gain for pregnancy:  9.461 kg (20 lb 13.7 oz)    Review of systems:     Gen: negative  CV:     negative  GI: negative  :   negative and good fetal movement noted   MS:    back pain   Neuro: negative  Pul: negative      A/P  1. Intrauterine pregnancy at 38w5d   2. Pregnancy Risk:  HIGH RISK        Diagnoses and all orders for this visit:    1. 38 weeks gestation of pregnancy (Primary)    2. Screening for blood or protein in urine  -     POC Urinalysis Dipstick    3. Supervision of other normal pregnancy    4. COVID-19 affecting pregnancy in third trimester    5. Herpes simplex virus type 2 (HSV-2) infection affecting pregnancy in third trimester    6. History of wound infection    7. Maternal anemia in pregnancy, antepartum    8. Obesity in pregnancy, antepartum    9. Previous  section    10. Request for sterilization        Pre-eclampsia symptoms were discussed and warnings were given.  Routine labor warnings were discussed and indications for L & D f/u including bleeding, regular contractions, decreased fetal movement or/and rupture of membranes.   Detailed instructions for  were provided      -----------------------  PLAN:   No follow-ups on file.      Abdulkadir Thompson MD  10/27/2021 10:35 EDT

## 2021-10-28 VITALS — BODY MASS INDEX: 45.35 KG/M2 | WEIGHT: 281 LBS | DIASTOLIC BLOOD PRESSURE: 70 MMHG | SYSTOLIC BLOOD PRESSURE: 120 MMHG

## 2021-10-30 ENCOUNTER — HOSPITAL ENCOUNTER (INPATIENT)
Facility: HOSPITAL | Age: 36
LOS: 2 days | Discharge: HOME | End: 2021-11-01
Attending: OBSTETRICS & GYNECOLOGY | Admitting: OBSTETRICS & GYNECOLOGY

## 2021-10-30 ENCOUNTER — ANESTHESIA (OUTPATIENT)
Dept: LABOR AND DELIVERY | Facility: HOSPITAL | Age: 36
End: 2021-10-30

## 2021-10-30 ENCOUNTER — ANESTHESIA EVENT (OUTPATIENT)
Dept: LABOR AND DELIVERY | Facility: HOSPITAL | Age: 36
End: 2021-10-30

## 2021-10-30 DIAGNOSIS — Z98.891 S/P CESAREAN SECTION: Primary | ICD-10-CM

## 2021-10-30 LAB
ABO GROUP BLD: NORMAL
ATMOSPHERIC PRESS: 742 MMHG
BASE EXCESS BLDCOV CALC-SCNC: -2.9 MMOL/L (ref -30–30)
BDY SITE: ABNORMAL
BLD GP AB SCN SERPL QL: NEGATIVE
COLLECT TME SMN: ABNORMAL
DEPRECATED RDW RBC AUTO: 42 FL (ref 37–54)
ERYTHROCYTE [DISTWIDTH] IN BLOOD BY AUTOMATED COUNT: 13.3 % (ref 12.3–15.4)
GAS FLOW AIRWAY: 3 LPM
HCO3 BLDCOV-SCNC: 20.8 MMOL/L
HCT VFR BLD AUTO: 32.7 % (ref 34–46.6)
HGB BLD-MCNC: 11.3 G/DL (ref 12–15.9)
MCH RBC QN AUTO: 30.1 PG (ref 26.6–33)
MCHC RBC AUTO-ENTMCNC: 34.6 G/DL (ref 31.5–35.7)
MCV RBC AUTO: 87.2 FL (ref 79–97)
MODALITY: ABNORMAL
NOTE: ABNORMAL
PCO2 BLDCOV: 32.4 MM HG (ref 35–51.3)
PH BLDCOV: 7.42 PH UNITS (ref 7.26–7.4)
PLATELET # BLD AUTO: 193 10*3/MM3 (ref 140–450)
PMV BLD AUTO: 9.5 FL (ref 6–12)
PO2 BLDCOV: 24.1 MM HG (ref 19–39)
RBC # BLD AUTO: 3.75 10*6/MM3 (ref 3.77–5.28)
RH BLD: POSITIVE
SAO2 % BLDCOA: 44.9 % (ref 92–99)
SAO2 % BLDCOV: ABNORMAL %
SARS-COV-2 RNA PNL SPEC NAA+PROBE: DETECTED
T&S EXPIRATION DATE: NORMAL
WBC # BLD AUTO: 8.42 10*3/MM3 (ref 3.4–10.8)

## 2021-10-30 PROCEDURE — 25010000002 MORPHINE PER 10 MG: Performed by: ANESTHESIOLOGY

## 2021-10-30 PROCEDURE — 25010000002 CEFAZOLIN PER 500 MG: Performed by: OBSTETRICS & GYNECOLOGY

## 2021-10-30 PROCEDURE — 59515 CESAREAN DELIVERY: CPT | Performed by: OBSTETRICS & GYNECOLOGY

## 2021-10-30 PROCEDURE — 86901 BLOOD TYPING SEROLOGIC RH(D): CPT | Performed by: OBSTETRICS & GYNECOLOGY

## 2021-10-30 PROCEDURE — 63710000001 DIPHENHYDRAMINE PER 50 MG: Performed by: NURSE ANESTHETIST, CERTIFIED REGISTERED

## 2021-10-30 PROCEDURE — 85027 COMPLETE CBC AUTOMATED: CPT | Performed by: OBSTETRICS & GYNECOLOGY

## 2021-10-30 PROCEDURE — 87635 SARS-COV-2 COVID-19 AMP PRB: CPT | Performed by: OBSTETRICS & GYNECOLOGY

## 2021-10-30 PROCEDURE — 25010000002 PHENYLEPHRINE 10 MG/ML SOLUTION: Performed by: NURSE ANESTHETIST, CERTIFIED REGISTERED

## 2021-10-30 PROCEDURE — 0UL70CZ OCCLUSION OF BILATERAL FALLOPIAN TUBES WITH EXTRALUMINAL DEVICE, OPEN APPROACH: ICD-10-PCS | Performed by: OBSTETRICS & GYNECOLOGY

## 2021-10-30 PROCEDURE — S0260 H&P FOR SURGERY: HCPCS | Performed by: OBSTETRICS & GYNECOLOGY

## 2021-10-30 PROCEDURE — C1889 IMPLANT/INSERT DEVICE, NOC: HCPCS | Performed by: OBSTETRICS & GYNECOLOGY

## 2021-10-30 PROCEDURE — 86900 BLOOD TYPING SEROLOGIC ABO: CPT | Performed by: OBSTETRICS & GYNECOLOGY

## 2021-10-30 PROCEDURE — 58611 LIGATE OVIDUCT(S) ADD-ON: CPT | Performed by: OBSTETRICS & GYNECOLOGY

## 2021-10-30 PROCEDURE — 25010000002 KETOROLAC TROMETHAMINE PER 15 MG: Performed by: NURSE ANESTHETIST, CERTIFIED REGISTERED

## 2021-10-30 PROCEDURE — 25010000002 MORPHINE PER 10 MG: Performed by: NURSE ANESTHETIST, CERTIFIED REGISTERED

## 2021-10-30 PROCEDURE — 82803 BLOOD GASES ANY COMBINATION: CPT

## 2021-10-30 PROCEDURE — 86850 RBC ANTIBODY SCREEN: CPT | Performed by: OBSTETRICS & GYNECOLOGY

## 2021-10-30 PROCEDURE — 25010000002 KETOROLAC TROMETHAMINE PER 15 MG: Performed by: OBSTETRICS & GYNECOLOGY

## 2021-10-30 DEVICE — CLIP FALLOP FILSHIE TI PR STRL BX/10: Type: IMPLANTABLE DEVICE | Site: FALLOPIAN TUBE | Status: FUNCTIONAL

## 2021-10-30 RX ORDER — ONDANSETRON 2 MG/ML
4 INJECTION INTRAMUSCULAR; INTRAVENOUS EVERY 6 HOURS PRN
Status: DISCONTINUED | OUTPATIENT
Start: 2021-10-30 | End: 2021-11-01 | Stop reason: HOSPADM

## 2021-10-30 RX ORDER — ONDANSETRON 4 MG/1
4 TABLET, FILM COATED ORAL EVERY 8 HOURS PRN
Status: DISCONTINUED | OUTPATIENT
Start: 2021-10-30 | End: 2021-11-01 | Stop reason: HOSPADM

## 2021-10-30 RX ORDER — CEFAZOLIN SODIUM IN 0.9 % NACL 3 G/100 ML
3 INTRAVENOUS SOLUTION, PIGGYBACK (ML) INTRAVENOUS ONCE
Status: COMPLETED | OUTPATIENT
Start: 2021-10-30 | End: 2021-10-30

## 2021-10-30 RX ORDER — HYDROMORPHONE HYDROCHLORIDE 1 MG/ML
0.5 INJECTION, SOLUTION INTRAMUSCULAR; INTRAVENOUS; SUBCUTANEOUS
Status: ACTIVE | OUTPATIENT
Start: 2021-10-30 | End: 2021-10-31

## 2021-10-30 RX ORDER — SODIUM CHLORIDE 0.9 % (FLUSH) 0.9 %
10 SYRINGE (ML) INJECTION AS NEEDED
Status: DISCONTINUED | OUTPATIENT
Start: 2021-10-30 | End: 2021-10-30 | Stop reason: HOSPADM

## 2021-10-30 RX ORDER — ONDANSETRON 2 MG/ML
4 INJECTION INTRAMUSCULAR; INTRAVENOUS ONCE AS NEEDED
Status: DISCONTINUED | OUTPATIENT
Start: 2021-10-30 | End: 2021-10-30 | Stop reason: HOSPADM

## 2021-10-30 RX ORDER — PHYTONADIONE 1 MG/.5ML
INJECTION, EMULSION INTRAMUSCULAR; INTRAVENOUS; SUBCUTANEOUS
Status: ACTIVE
Start: 2021-10-30 | End: 2021-10-30

## 2021-10-30 RX ORDER — ACETAMINOPHEN 500 MG
1000 TABLET ORAL ONCE
Status: COMPLETED | OUTPATIENT
Start: 2021-10-30 | End: 2021-10-30

## 2021-10-30 RX ORDER — CARBOPROST TROMETHAMINE 250 UG/ML
250 INJECTION, SOLUTION INTRAMUSCULAR
Status: DISCONTINUED | OUTPATIENT
Start: 2021-10-30 | End: 2021-10-30 | Stop reason: HOSPADM

## 2021-10-30 RX ORDER — OXYTOCIN-SODIUM CHLORIDE 0.9% IV SOLN 30 UNIT/500ML 30-0.9/5 UT/ML-%
250 SOLUTION INTRAVENOUS CONTINUOUS PRN
Status: ACTIVE | OUTPATIENT
Start: 2021-10-30 | End: 2021-10-30

## 2021-10-30 RX ORDER — OXYCODONE AND ACETAMINOPHEN 7.5; 325 MG/1; MG/1
1 TABLET ORAL EVERY 4 HOURS PRN
Status: DISCONTINUED | OUTPATIENT
Start: 2021-10-30 | End: 2021-11-01 | Stop reason: HOSPADM

## 2021-10-30 RX ORDER — OXYTOCIN-SODIUM CHLORIDE 0.9% IV SOLN 30 UNIT/500ML 30-0.9/5 UT/ML-%
999 SOLUTION INTRAVENOUS ONCE
Status: COMPLETED | OUTPATIENT
Start: 2021-10-30 | End: 2021-10-30

## 2021-10-30 RX ORDER — FAMOTIDINE 10 MG/ML
20 INJECTION, SOLUTION INTRAVENOUS ONCE AS NEEDED
Status: COMPLETED | OUTPATIENT
Start: 2021-10-30 | End: 2021-10-30

## 2021-10-30 RX ORDER — SIMETHICONE 80 MG
80 TABLET,CHEWABLE ORAL 4 TIMES DAILY PRN
Status: DISCONTINUED | OUTPATIENT
Start: 2021-10-30 | End: 2021-11-01 | Stop reason: HOSPADM

## 2021-10-30 RX ORDER — IBUPROFEN 800 MG/1
800 TABLET ORAL EVERY 8 HOURS PRN
Status: DISCONTINUED | OUTPATIENT
Start: 2021-10-30 | End: 2021-11-01 | Stop reason: HOSPADM

## 2021-10-30 RX ORDER — EPHEDRINE SULFATE 50 MG/ML
INJECTION, SOLUTION INTRAVENOUS AS NEEDED
Status: DISCONTINUED | OUTPATIENT
Start: 2021-10-30 | End: 2021-10-30 | Stop reason: SURG

## 2021-10-30 RX ORDER — SODIUM CHLORIDE, SODIUM LACTATE, POTASSIUM CHLORIDE, CALCIUM CHLORIDE 600; 310; 30; 20 MG/100ML; MG/100ML; MG/100ML; MG/100ML
125 INJECTION, SOLUTION INTRAVENOUS CONTINUOUS
Status: DISCONTINUED | OUTPATIENT
Start: 2021-10-30 | End: 2021-10-30

## 2021-10-30 RX ORDER — OXYTOCIN-SODIUM CHLORIDE 0.9% IV SOLN 30 UNIT/500ML 30-0.9/5 UT/ML-%
125 SOLUTION INTRAVENOUS CONTINUOUS PRN
Status: COMPLETED | OUTPATIENT
Start: 2021-10-30 | End: 2021-10-30

## 2021-10-30 RX ORDER — DIPHENHYDRAMINE HCL 25 MG
25 CAPSULE ORAL EVERY 4 HOURS PRN
Status: DISCONTINUED | OUTPATIENT
Start: 2021-10-30 | End: 2021-11-01 | Stop reason: HOSPADM

## 2021-10-30 RX ORDER — ONDANSETRON 2 MG/ML
4 INJECTION INTRAMUSCULAR; INTRAVENOUS ONCE AS NEEDED
Status: DISCONTINUED | OUTPATIENT
Start: 2021-10-30 | End: 2021-11-01 | Stop reason: HOSPADM

## 2021-10-30 RX ORDER — DIPHENHYDRAMINE HYDROCHLORIDE 50 MG/ML
25 INJECTION INTRAMUSCULAR; INTRAVENOUS EVERY 4 HOURS PRN
Status: DISCONTINUED | OUTPATIENT
Start: 2021-10-30 | End: 2021-11-01 | Stop reason: HOSPADM

## 2021-10-30 RX ORDER — KETOROLAC TROMETHAMINE 15 MG/ML
15 INJECTION, SOLUTION INTRAMUSCULAR; INTRAVENOUS EVERY 6 HOURS PRN
Status: DISPENSED | OUTPATIENT
Start: 2021-10-30 | End: 2021-10-31

## 2021-10-30 RX ORDER — ERYTHROMYCIN 5 MG/G
OINTMENT OPHTHALMIC
Status: ACTIVE
Start: 2021-10-30 | End: 2021-10-30

## 2021-10-30 RX ORDER — MORPHINE SULFATE 2 MG/ML
2 INJECTION, SOLUTION INTRAMUSCULAR; INTRAVENOUS
Status: DISPENSED | OUTPATIENT
Start: 2021-10-30 | End: 2021-10-31

## 2021-10-30 RX ORDER — SODIUM CHLORIDE 0.9 % (FLUSH) 0.9 %
10 SYRINGE (ML) INJECTION EVERY 12 HOURS SCHEDULED
Status: DISCONTINUED | OUTPATIENT
Start: 2021-10-30 | End: 2021-10-30 | Stop reason: HOSPADM

## 2021-10-30 RX ORDER — HYDROXYZINE 50 MG/1
50 TABLET, FILM COATED ORAL EVERY 6 HOURS PRN
Status: DISCONTINUED | OUTPATIENT
Start: 2021-10-30 | End: 2021-11-01 | Stop reason: HOSPADM

## 2021-10-30 RX ORDER — MISOPROSTOL 200 UG/1
800 TABLET ORAL ONCE AS NEEDED
Status: DISCONTINUED | OUTPATIENT
Start: 2021-10-30 | End: 2021-10-30 | Stop reason: HOSPADM

## 2021-10-30 RX ORDER — CEFAZOLIN SODIUM 2 G/100ML
2 INJECTION, SOLUTION INTRAVENOUS ONCE
Status: DISCONTINUED | OUTPATIENT
Start: 2021-10-30 | End: 2021-10-30

## 2021-10-30 RX ORDER — OXYCODONE HYDROCHLORIDE AND ACETAMINOPHEN 5; 325 MG/1; MG/1
1 TABLET ORAL EVERY 4 HOURS PRN
Status: DISCONTINUED | OUTPATIENT
Start: 2021-10-30 | End: 2021-11-01 | Stop reason: HOSPADM

## 2021-10-30 RX ORDER — HYDROCORTISONE 25 MG/G
CREAM TOPICAL 3 TIMES DAILY PRN
Status: DISCONTINUED | OUTPATIENT
Start: 2021-10-30 | End: 2021-11-01 | Stop reason: HOSPADM

## 2021-10-30 RX ORDER — METHYLERGONOVINE MALEATE 0.2 MG/ML
200 INJECTION INTRAVENOUS ONCE AS NEEDED
Status: DISCONTINUED | OUTPATIENT
Start: 2021-10-30 | End: 2021-10-30 | Stop reason: HOSPADM

## 2021-10-30 RX ORDER — BUPIVACAINE HYDROCHLORIDE 7.5 MG/ML
INJECTION, SOLUTION EPIDURAL; RETROBULBAR
Status: COMPLETED | OUTPATIENT
Start: 2021-10-30 | End: 2021-10-30

## 2021-10-30 RX ORDER — PHENYLEPHRINE HYDROCHLORIDE 10 MG/ML
INJECTION INTRAVENOUS AS NEEDED
Status: DISCONTINUED | OUTPATIENT
Start: 2021-10-30 | End: 2021-10-30 | Stop reason: SURG

## 2021-10-30 RX ORDER — NALOXONE HCL 0.4 MG/ML
0.2 VIAL (ML) INJECTION
Status: CANCELLED | OUTPATIENT
Start: 2021-10-30

## 2021-10-30 RX ORDER — MORPHINE SULFATE 1 MG/ML
INJECTION, SOLUTION EPIDURAL; INTRATHECAL; INTRAVENOUS
Status: COMPLETED | OUTPATIENT
Start: 2021-10-30 | End: 2021-10-30

## 2021-10-30 RX ORDER — OXYTOCIN-SODIUM CHLORIDE 0.9% IV SOLN 30 UNIT/500ML 30-0.9/5 UT/ML-%
SOLUTION INTRAVENOUS
Status: COMPLETED
Start: 2021-10-30 | End: 2021-10-30

## 2021-10-30 RX ORDER — KETOROLAC TROMETHAMINE 30 MG/ML
INJECTION, SOLUTION INTRAMUSCULAR; INTRAVENOUS AS NEEDED
Status: DISCONTINUED | OUTPATIENT
Start: 2021-10-30 | End: 2021-10-30 | Stop reason: SURG

## 2021-10-30 RX ADMIN — KETOROLAC TROMETHAMINE 30 MG: 30 INJECTION, SOLUTION INTRAMUSCULAR; INTRAVENOUS at 10:46

## 2021-10-30 RX ADMIN — SODIUM CHLORIDE, POTASSIUM CHLORIDE, SODIUM LACTATE AND CALCIUM CHLORIDE 125 ML/HR: 600; 310; 30; 20 INJECTION, SOLUTION INTRAVENOUS at 07:10

## 2021-10-30 RX ADMIN — ACETAMINOPHEN 1000 MG: 500 TABLET ORAL at 09:37

## 2021-10-30 RX ADMIN — KETOROLAC TROMETHAMINE 15 MG: 15 INJECTION, SOLUTION INTRAMUSCULAR; INTRAVENOUS at 23:55

## 2021-10-30 RX ADMIN — OXYTOCIN-SODIUM CHLORIDE 0.9% IV SOLN 30 UNIT/500ML 125 ML/HR: 30-0.9/5 SOLUTION at 12:08

## 2021-10-30 RX ADMIN — EPHEDRINE SULFATE 10 MG: 50 INJECTION INTRAVENOUS at 10:11

## 2021-10-30 RX ADMIN — DIPHENHYDRAMINE HYDROCHLORIDE 25 MG: 25 CAPSULE ORAL at 12:30

## 2021-10-30 RX ADMIN — EPHEDRINE SULFATE 5 MG: 50 INJECTION INTRAVENOUS at 10:43

## 2021-10-30 RX ADMIN — SIMETHICONE 80 MG: 80 TABLET, CHEWABLE ORAL at 20:19

## 2021-10-30 RX ADMIN — SODIUM CHLORIDE, POTASSIUM CHLORIDE, SODIUM LACTATE AND CALCIUM CHLORIDE 125 ML/HR: 600; 310; 30; 20 INJECTION, SOLUTION INTRAVENOUS at 08:42

## 2021-10-30 RX ADMIN — PHENYLEPHRINE HYDROCHLORIDE 100 MCG: 10 INJECTION INTRAVENOUS at 10:43

## 2021-10-30 RX ADMIN — KETOROLAC TROMETHAMINE 15 MG: 15 INJECTION, SOLUTION INTRAMUSCULAR; INTRAVENOUS at 16:46

## 2021-10-30 RX ADMIN — EPHEDRINE SULFATE 10 MG: 50 INJECTION INTRAVENOUS at 10:17

## 2021-10-30 RX ADMIN — MORPHINE SULFATE 200 MCG: 1 INJECTION, SOLUTION EPIDURAL; INTRATHECAL; INTRAVENOUS at 10:09

## 2021-10-30 RX ADMIN — PHENYLEPHRINE HYDROCHLORIDE 100 MCG: 10 INJECTION INTRAVENOUS at 10:40

## 2021-10-30 RX ADMIN — PHENYLEPHRINE HYDROCHLORIDE 150 MCG: 10 INJECTION INTRAVENOUS at 10:26

## 2021-10-30 RX ADMIN — OXYTOCIN 125 ML/HR: 10 INJECTION INTRAVENOUS at 12:08

## 2021-10-30 RX ADMIN — OXYCODONE AND ACETAMINOPHEN 1 TABLET: 5; 325 TABLET ORAL at 20:19

## 2021-10-30 RX ADMIN — EPHEDRINE SULFATE 5 MG: 50 INJECTION INTRAVENOUS at 10:15

## 2021-10-30 RX ADMIN — FAMOTIDINE 20 MG: 10 INJECTION INTRAVENOUS at 09:37

## 2021-10-30 RX ADMIN — OXYTOCIN-SODIUM CHLORIDE 0.9% IV SOLN 30 UNIT/500ML 999 ML/HR: 30-0.9/5 SOLUTION at 10:28

## 2021-10-30 RX ADMIN — CEFAZOLIN SODIUM 3 G: 10 INJECTION, POWDER, FOR SOLUTION INTRAVENOUS at 10:09

## 2021-10-30 RX ADMIN — BUPIVACAINE HYDROCHLORIDE 2 ML: 7.5 INJECTION, SOLUTION EPIDURAL; RETROBULBAR at 10:09

## 2021-10-30 RX ADMIN — MORPHINE SULFATE 2 MG: 2 INJECTION, SOLUTION INTRAMUSCULAR; INTRAVENOUS at 12:08

## 2021-10-30 RX ADMIN — PHENYLEPHRINE HYDROCHLORIDE 150 MCG: 10 INJECTION INTRAVENOUS at 10:15

## 2021-10-30 NOTE — ANESTHESIA PREPROCEDURE EVALUATION
Anesthesia Evaluation     NPO Solid Status: > 8 hours             Airway   Mallampati: II  TM distance: >3 FB  Neck ROM: full  no difficulty expected  Dental - normal exam     Pulmonary - normal exam   Cardiovascular - normal exam        Neuro/Psych  GI/Hepatic/Renal/Endo    (+) obesity, morbid obesity,      Musculoskeletal     Abdominal  - normal exam   Substance History      OB/GYN    (+) Pregnant,         Other                        Anesthesia Plan    ASA 2     spinal   (  39w1d  )    Anesthetic plan, all risks, benefits, and alternatives have been provided, discussed and informed consent has been obtained with: patient.

## 2021-10-30 NOTE — ANESTHESIA POSTPROCEDURE EVALUATION
"Patient: Saima Rausch    Procedure Summary     Date: 10/30/21 Room / Location:  [facility]    Anesthesia Start: 959 Anesthesia Stop:     Procedure:  SECTION REPEAT WITH TUBAL (N/A Abdomen) Diagnosis:       Previous  section      Request for sterilization      (Previous  section [Z98.891])      (Request for sterilization [Z30.2])    Surgeons: Abdulkadir Thompson MD Provider: Ean Pope MD    Anesthesia Type: spinal ASA Status: 2          Anesthesia Type: spinal    Vitals  Vitals Value Taken Time   /54 10/30/21 1117   Temp     Pulse 67 10/30/21 1117   Resp     SpO2 100 % 10/30/21 1115   Vitals shown include unvalidated device data.        Post Anesthesia Care and Evaluation    Patient location during evaluation: bedside  Patient participation: complete - patient participated  Level of consciousness: awake and alert  Pain management: adequate  Airway patency: patent  Anesthetic complications: No anesthetic complications    Cardiovascular status: acceptable  Respiratory status: acceptable  Hydration status: acceptable    Comments: /74 (BP Location: Right arm, Patient Position: Lying)   Pulse 72   Temp 36.9 °C (98.5 °F) (Oral)   Resp 18   Ht 167.6 cm (66\")   Wt 127 kg (281 lb)   LMP 2021   SpO2 98%   Breastfeeding No   BMI 45.35 kg/m²       "

## 2021-10-30 NOTE — ANESTHESIA PROCEDURE NOTES
Spinal Block      Patient location during procedure: OR  Stop Time: 10/30/2021 10:09 AM  Indication:at surgeon's request, post-op pain management and procedure for pain  Performed By  Anesthesiologist: Ean Pope MD  Preanesthetic Checklist  Completed: patient identified, IV checked, site marked, risks and benefits discussed, surgical consent, monitors and equipment checked, pre-op evaluation and timeout performed  Spinal Block Prep:  Patient Position:sitting  Sterile Tech:cap, gloves, mask and sterile barriers  Prep:Chloraprep  Patient Monitoring:blood pressure monitoring and continuous pulse oximetry  Spinal Block Procedure  Approach:midline  Guidance:landmark technique  Location:L3-L4  Needle Type:Sprotte  Needle Gauge:24 G  Placement of Spinal needle event:cerebrospinal fluid aspirated  Paresthesia: no  Fluid Appearance:clear  Medications: Morphine PF injection, 200 mcg  bupivacaine PF (MARCAINE) 0.75 % injection, 2 mL  Med Administered at 10/30/2021 10:09 AM   Post Assessment  Patient Tolerance:patient tolerated the procedure well with no apparent complications  Complications no

## 2021-10-31 LAB
BASOPHILS # BLD AUTO: 0.04 10*3/MM3 (ref 0–0.2)
BASOPHILS NFR BLD AUTO: 0.4 % (ref 0–1.5)
DEPRECATED RDW RBC AUTO: 44.1 FL (ref 37–54)
EOSINOPHIL # BLD AUTO: 0.1 10*3/MM3 (ref 0–0.4)
EOSINOPHIL NFR BLD AUTO: 1.1 % (ref 0.3–6.2)
ERYTHROCYTE [DISTWIDTH] IN BLOOD BY AUTOMATED COUNT: 13.2 % (ref 12.3–15.4)
HCT VFR BLD AUTO: 29.1 % (ref 34–46.6)
HGB BLD-MCNC: 9.6 G/DL (ref 12–15.9)
IMM GRANULOCYTES # BLD AUTO: 0.04 10*3/MM3 (ref 0–0.05)
IMM GRANULOCYTES NFR BLD AUTO: 0.4 % (ref 0–0.5)
LYMPHOCYTES # BLD AUTO: 1.57 10*3/MM3 (ref 0.7–3.1)
LYMPHOCYTES NFR BLD AUTO: 16.6 % (ref 19.6–45.3)
MCH RBC QN AUTO: 29.7 PG (ref 26.6–33)
MCHC RBC AUTO-ENTMCNC: 33 G/DL (ref 31.5–35.7)
MCV RBC AUTO: 90.1 FL (ref 79–97)
MONOCYTES # BLD AUTO: 0.42 10*3/MM3 (ref 0.1–0.9)
MONOCYTES NFR BLD AUTO: 4.4 % (ref 5–12)
NEUTROPHILS NFR BLD AUTO: 7.31 10*3/MM3 (ref 1.7–7)
NEUTROPHILS NFR BLD AUTO: 77.1 % (ref 42.7–76)
NRBC BLD AUTO-RTO: 0 /100 WBC (ref 0–0.2)
PLATELET # BLD AUTO: 159 10*3/MM3 (ref 140–450)
PMV BLD AUTO: 9.7 FL (ref 6–12)
RBC # BLD AUTO: 3.23 10*6/MM3 (ref 3.77–5.28)
WBC # BLD AUTO: 9.48 10*3/MM3 (ref 3.4–10.8)

## 2021-10-31 PROCEDURE — 25010000002 KETOROLAC TROMETHAMINE PER 15 MG: Performed by: OBSTETRICS & GYNECOLOGY

## 2021-10-31 PROCEDURE — 85025 COMPLETE CBC W/AUTO DIFF WBC: CPT | Performed by: OBSTETRICS & GYNECOLOGY

## 2021-10-31 RX ADMIN — SIMETHICONE 80 MG: 80 TABLET, CHEWABLE ORAL at 13:01

## 2021-10-31 RX ADMIN — SIMETHICONE 80 MG: 80 TABLET, CHEWABLE ORAL at 05:49

## 2021-10-31 RX ADMIN — OXYCODONE AND ACETAMINOPHEN 1 TABLET: 7.5; 325 TABLET ORAL at 13:01

## 2021-10-31 RX ADMIN — OXYCODONE AND ACETAMINOPHEN 1 TABLET: 7.5; 325 TABLET ORAL at 17:14

## 2021-10-31 RX ADMIN — IBUPROFEN 800 MG: 800 TABLET, FILM COATED ORAL at 17:14

## 2021-10-31 RX ADMIN — KETOROLAC TROMETHAMINE 15 MG: 15 INJECTION, SOLUTION INTRAMUSCULAR; INTRAVENOUS at 07:56

## 2021-10-31 RX ADMIN — OXYCODONE AND ACETAMINOPHEN 1 TABLET: 5; 325 TABLET ORAL at 05:32

## 2021-11-01 VITALS
BODY MASS INDEX: 45.16 KG/M2 | HEIGHT: 66 IN | DIASTOLIC BLOOD PRESSURE: 70 MMHG | SYSTOLIC BLOOD PRESSURE: 113 MMHG | HEART RATE: 69 BPM | OXYGEN SATURATION: 97 % | WEIGHT: 281 LBS | TEMPERATURE: 98 F | RESPIRATION RATE: 16 BRPM

## 2021-11-01 PROCEDURE — 0503F POSTPARTUM CARE VISIT: CPT | Performed by: STUDENT IN AN ORGANIZED HEALTH CARE EDUCATION/TRAINING PROGRAM

## 2021-11-01 RX ORDER — OXYCODONE HYDROCHLORIDE AND ACETAMINOPHEN 5; 325 MG/1; MG/1
1 TABLET ORAL EVERY 4 HOURS PRN
Qty: 12 TABLET | Refills: 0 | Status: SHIPPED | OUTPATIENT
Start: 2021-11-01 | End: 2021-11-06

## 2021-11-01 RX ORDER — IBUPROFEN 800 MG/1
800 TABLET ORAL EVERY 8 HOURS PRN
Qty: 30 TABLET | Refills: 1 | Status: SHIPPED | OUTPATIENT
Start: 2021-11-01

## 2021-11-01 RX ORDER — DOCUSATE SODIUM 100 MG/1
100 CAPSULE, LIQUID FILLED ORAL 2 TIMES DAILY
Qty: 60 CAPSULE | Refills: 1 | Status: SHIPPED | OUTPATIENT
Start: 2021-11-01

## 2021-11-01 RX ADMIN — OXYCODONE AND ACETAMINOPHEN 1 TABLET: 7.5; 325 TABLET ORAL at 08:08

## 2021-11-01 RX ADMIN — OXYCODONE AND ACETAMINOPHEN 1 TABLET: 7.5; 325 TABLET ORAL at 00:53

## 2021-11-01 RX ADMIN — IBUPROFEN 800 MG: 800 TABLET, FILM COATED ORAL at 09:16

## 2021-11-01 RX ADMIN — IBUPROFEN 800 MG: 800 TABLET, FILM COATED ORAL at 00:53

## 2021-11-01 NOTE — PROGRESS NOTES
[facility]   Obstetrics and Gynecology     2021    Name:Saima Rausch    MR#:[id number]     Progress Note:  Post-Op 2 S/P    HD:2    Subjective   36 y.o. yo Female  s/p CS and BTL at 39w1d doing well. Pain well controlled. Tolerating regular diet and having flatus. Lochia normal. Breastfeeding without issue.    Patient Active Problem List   Diagnosis   • Pregnancy   • Herpes, vulvar   • History of wound infection   • Previous  section   • Maternal anemia in pregnancy, antepartum   • Obesity in pregnancy, antepartum   • Genetic screening   • Incomplete anatomic survey    • Request for sterilization   • COVID-19 affecting pregnancy in third trimester   • Supervision of other normal pregnancy   • Herpes simplex virus type 2 (HSV-2) infection affecting pregnancy in third trimester   • S/P  section        Objective    Vitals  Temp:  Temp:  [97.4 °F (36.3 °C)-97.9 °F (36.6 °C)] 97.4 °F (36.3 °C)  Temp src: Oral  BP:  BP: (115-123)/(72-74) 123/73  Pulse:  Heart Rate:  [61-69] 64  RR:   Resp:  [16-18] 16  Weight: 127 kg (281 lb)  BMI:  Body mass index is 45.35 kg/m².    Physical Exam  Vitals and nursing note reviewed.   Constitutional:       General: She is not in acute distress.     Appearance: Normal appearance.   Cardiovascular:      Pulses: Normal pulses.   Pulmonary:      Effort: Pulmonary effort is normal.   Abdominal:      General: There is no distension.      Palpations: Abdomen is soft.      Tenderness: There is no abdominal tenderness. There is no guarding or rebound.      Comments: Incision c/d/i   Musculoskeletal:         General: No swelling or tenderness.      Right lower leg: No edema.      Left lower leg: No edema.   Neurological:      Mental Status: She is alert and oriented to person, place, and time.   Psychiatric:         Mood and Affect: Mood normal.         Behavior: Behavior normal.           I/O last 3 completed shifts:  In: 1700  [P.O.:1700]  Out: 2575 [Urine:2575]    LABS:  Results from last 7 days   Lab Units 10/31/21  0839 10/30/21  0719   WBC 10*3/mm3 9.48 8.42   HEMOGLOBIN g/dL 9.6* 11.3*   HEMATOCRIT % 29.1* 32.7*   PLATELETS 10*3/mm3 159 193             Infant: male       Assessment    1.  POD 2 CS and BTL    Plan:  Discharge today       S/P  section    Previous  section    Request for sterilization      Aura Burnette MD  2021 09:43 EDT

## 2021-11-01 NOTE — DISCHARGE SUMMARY
[facility]   Obstetrics and Gynecology    Section Discharge Summary    Date of Admission: 10/30/2021  Date of Discharge:  2021      Patient: Saima Rausch      MR#:[id number]    Surgeon/OB: Abdulkadir Thompson MD    Discharge Diagnosis:  section at 39w1d, uncomplicated recovery    Procedures:  , Low Transverse     10/30/2021    10:27 AM      Anesthesia:  Spinal     Presenting Problem/History of Present Illness  Previous  section [Z98.891]  Request for sterilization [Z30.2]  Labor and delivery, indication for care [O75.9]  Labor and delivery, indication for care [O75.9]  S/P  section [Z98.891]     Patient Active Problem List   Diagnosis   • Pregnancy   • Herpes, vulvar   • History of wound infection   • Previous  section   • Maternal anemia in pregnancy, antepartum   • Obesity in pregnancy, antepartum   • Genetic screening   • Incomplete anatomic survey    • Request for sterilization   • COVID-19 affecting pregnancy in third trimester   • Supervision of other normal pregnancy   • Herpes simplex virus type 2 (HSV-2) infection affecting pregnancy in third trimester   • S/P  section       Hospital Course  Patient is a 36 y.o. female  at 39w1d status post  section with uneventful postoperative recovery.  Patient was advanced to regular diet on postoperative day#1.  On discharge, ambulating, tolerating a regular diet without any difficulties and her incision is dry, clean and intact.     Infant:   male  fetus 3370 g (7 lb 6.9 oz)  with Apgar scores of 9 , 9  at five minutes.    Condition on Discharge:  Stable    Vital Signs  Temp:  [97.4 °F (36.3 °C)-98 °F (36.7 °C)] 98 °F (36.7 °C)  Heart Rate:  [61-69] 69  Resp:  [16-18] 16  BP: (113-123)/(70-74) 113/70    Results from last 7 days   Lab Units 10/31/21  0839 10/30/21  0719   WBC 10*3/mm3 9.48 8.42   HEMOGLOBIN g/dL 9.6* 11.3*   HEMATOCRIT % 29.1* 32.7*   PLATELETS 10*3/mm3 159 193              Discharge Disposition  Home or Self Care    Discharge Medications     Your medication list      START taking these medications      Instructions Last Dose Given Next Dose Due   docusate sodium 100 MG capsule  Commonly known as: Colace      Take 1 capsule by mouth 2 (Two) Times a Day.       ibuprofen 800 MG tablet  Commonly known as: ADVIL,MOTRIN      Take 1 tablet by mouth Every 8 (Eight) Hours As Needed for Mild Pain .       oxyCODONE-acetaminophen 5-325 MG per tablet  Commonly known as: PERCOCET      Take 1 tablet by mouth Every 4 (Four) Hours As Needed for Moderate Pain  for up to 5 days.          CONTINUE taking these medications      Instructions Last Dose Given Next Dose Due   ferrous sulfate 325 (65 FE) MG tablet      Take 1 tablet by mouth Daily With Breakfast.       prenatal vitamin 27-0.8 27-0.8 MG tablet tablet      Take  by mouth Daily.       valACYclovir 1000 MG tablet  Commonly known as: Valtrex      Take 1 tablet by mouth Daily.       ZYRTEC ALLERGY PO      Take  by mouth.             Where to Get Your Medications      These medications were sent to [address] - [phone]  - [phone] [place] [address]    Phone: [phone]   · docusate sodium 100 MG capsule  · ibuprofen 800 MG tablet  · oxyCODONE-acetaminophen 5-325 MG per tablet           Discharge Diet: Regular    Follow-up Appointments  Future Appointments   Date Time Provider Department Center   11/19/2021 11:15 AM Nadiya Ogden APRN [facility]     Additional Instructions for the Follow-ups that You Need to Schedule     Call MD for problems / concerns.   As directed      Instructions:    1.  Call for heavy vaginal bleeding (greater than 2 pads an hours for 2 hours)  2.  Fever above 101.3  3.  Incisional redness    Order Comments: Instructions:  1.  Call for heavy vaginal bleeding (greater than 2 pads an hours for 2  hours) 2.  Fever above 101.3 3.  Incisional redness          Discharge Follow-up with Specified Provider: Abdulkadir Thompson; 1 Week   As directed      To: Abdulkadir Thompson    Follow Up: 1 Week    Follow Up Details: as scheduled               Prenatal labs/vax:   Immunization History   Administered Date(s) Administered   • Hepatitis A 07/03/2018   • Tdap 08/23/2021       External Prenatal Results     Pregnancy Outside Results - Transcribed From Office Records - See Scanned Records For Details     Test Value Date Time    ABO  A  10/30/21 0719    Rh  Positive  10/30/21 0719    Antibody Screen  Negative  10/30/21 0719       Negative  04/08/21 1504    Varicella IgG  1,102 index 04/08/21 1504    Rubella  4.94 index 04/08/21 1504    Hgb  9.6 g/dL 10/31/21 0839       11.3 g/dL 10/30/21 0719       10.5 g/dL 08/23/21 1540       12.3 g/dL 08/08/21 1411       10.5 g/dL 04/08/21 1504    Hct  29.1 % 10/31/21 0839       32.7 % 10/30/21 0719       31.8 % 08/23/21 1540       36.7 % 08/08/21 1411       31.5 % 04/08/21 1504    Glucose Fasting GTT  83 mg/dL 08/30/21 0824    Glucose Tolerance Test 1 hour  146 mg/dL 08/30/21 0824    Glucose Tolerance Test 3 hour  109 mg/dL 08/30/21 0824    Gonorrhea (discrete)  Negative  04/05/21 1550    Chlamydia (discrete)  Negative  04/05/21 1550    RPR  Non Reactive  04/08/21 1504    VDRL       Syphilis Antibody       HBsAg  Negative  04/08/21 1504    Herpes Simplex Virus PCR       Herpes Simplex VIrus Culture       HIV  Non Reactive  04/08/21 1504    Hep C RNA Quant PCR       Hep C Antibody  <0.1 s/co ratio 04/08/21 1504    AFP       Group B Strep  Negative  10/13/21 1332    GBS Susceptibility to Clindamycin       GBS Susceptibility to Erythromycin       Fetal Fibronectin       Genetic Testing, Maternal Blood             Drug Screening     Test Value Date Time    Urine Drug Screen       Amphetamine Screen       Barbiturate Screen       Benzodiazepine Screen       Methadone Screen       Phencyclidine Screen        Opiates Screen       THC Screen       Cocaine Screen       Propoxyphene Screen       Buprenorphine Screen       Methamphetamine Screen       Oxycodone Screen       Tricyclic Antidepressants Screen             Legend    ^: Historical                          Aura Burnette MD  11/1/2021  12:21 EDT     negative...

## 2021-11-19 ENCOUNTER — POSTPARTUM VISIT (OUTPATIENT)
Dept: OBSTETRICS AND GYNECOLOGY | Age: 36
End: 2021-11-19

## 2021-11-19 VITALS
WEIGHT: 270 LBS | SYSTOLIC BLOOD PRESSURE: 122 MMHG | HEIGHT: 66 IN | DIASTOLIC BLOOD PRESSURE: 70 MMHG | BODY MASS INDEX: 43.39 KG/M2

## 2021-11-19 DIAGNOSIS — Z91.89 BREASTFEEDING PROBLEM: ICD-10-CM

## 2021-11-19 PROCEDURE — 0503F POSTPARTUM CARE VISIT: CPT | Performed by: NURSE PRACTITIONER

## 2021-11-19 NOTE — PROGRESS NOTES
"Subjective     Chief Complaint   Patient presents with   • Postpartum Care     Incision check       Saima Rausch is a 36 y.o.  whose LMP is Patient's last menstrual period was 2021.     Pt presents today for incision check  She delivered at 39+1 via repeat c/s   She also had a tubal ligation  Breast feeding. Thinks she has a clogged milk duct. No redness, warmth or fever. She did have latching issues but this has resolved. Thinks its getting better.   Denies pain, no longer needing any pain medication  Very light spotting with wiping   Denies depression  Pt of Dr. Thompson       No Additional Complaints Reported    The following portions of the patient's history were reviewed and updated as appropriate:vital signs, allergies, current medications, past medical history, past social history, past surgical history and problem list      Review of Systems   Pertinent items are noted in HPI.     Objective      /70   Ht 167.6 cm (66\")   Wt 122 kg (270 lb)   LMP 2021   Breastfeeding Yes   BMI 43.58 kg/m²     Physical Exam    General:   alert, no distress and moderately obese   Heart: Not performed today   Lungs: Not performed today.   Breast: Single white papule to left nipple that is tender to palpation   Neck: na   Abdomen: Incision C/D/I   CVA: Not performed today   Pelvis: Not performed today   Extremities: Not performed today   Neurologic: negative   Psychiatric: Normal affect, judgement, and mood       Lab Review   Labs: No data reviewed     Imaging   No data reviewed    Assessment/Plan     ASSESSMENT  1. Postpartum examination following  delivery    2. Breastfeeding problem        PLAN  1. No orders of the defined types were placed in this encounter.      2. Medications prescribed this encounter:      No orders of the defined types were placed in this encounter.      3. Incision C/D/I. No s/s infection. Discussed treatment of clogged milk duct. Continue breastfeeding. Latch " issues have been addressed and this seems to be improving. Warm compresses. Discussed s/s mastitis and to call if any occur.     Follow up: 2-3 week(s) for postpartum exam with Dr. Jay Ogden, APRN  11/19/2021

## 2021-12-10 ENCOUNTER — POSTPARTUM VISIT (OUTPATIENT)
Dept: OBSTETRICS AND GYNECOLOGY | Age: 36
End: 2021-12-10

## 2021-12-10 VITALS
DIASTOLIC BLOOD PRESSURE: 74 MMHG | HEIGHT: 66 IN | WEIGHT: 264 LBS | SYSTOLIC BLOOD PRESSURE: 124 MMHG | BODY MASS INDEX: 42.43 KG/M2

## 2021-12-10 DIAGNOSIS — Z09 POSTOP CHECK: Primary | ICD-10-CM

## 2021-12-10 DIAGNOSIS — Z13.89 SCREENING FOR BLOOD OR PROTEIN IN URINE: ICD-10-CM

## 2021-12-10 PROBLEM — A60.04 HERPES, VULVAR: Status: RESOLVED | Noted: 2021-04-05 | Resolved: 2021-12-10

## 2021-12-10 PROBLEM — O99.019 MATERNAL ANEMIA IN PREGNANCY, ANTEPARTUM: Status: RESOLVED | Noted: 2021-04-13 | Resolved: 2021-12-10

## 2021-12-10 PROBLEM — Z98.891 S/P CESAREAN SECTION: Status: RESOLVED | Noted: 2021-10-30 | Resolved: 2021-12-10

## 2021-12-10 PROBLEM — Z30.2 REQUEST FOR STERILIZATION: Status: RESOLVED | Noted: 2021-08-23 | Resolved: 2021-12-10

## 2021-12-10 PROBLEM — O98.513 COVID-19 AFFECTING PREGNANCY IN THIRD TRIMESTER: Status: RESOLVED | Noted: 2021-08-08 | Resolved: 2021-12-10

## 2021-12-10 PROBLEM — O99.210 OBESITY IN PREGNANCY, ANTEPARTUM: Status: RESOLVED | Noted: 2021-05-05 | Resolved: 2021-12-10

## 2021-12-10 PROBLEM — Z34.80 SUPERVISION OF OTHER NORMAL PREGNANCY: Status: RESOLVED | Noted: 2021-10-21 | Resolved: 2021-12-10

## 2021-12-10 PROBLEM — U07.1 COVID-19 AFFECTING PREGNANCY IN THIRD TRIMESTER: Status: RESOLVED | Noted: 2021-08-08 | Resolved: 2021-12-10

## 2021-12-10 PROBLEM — Z98.891 PREVIOUS CESAREAN SECTION: Status: RESOLVED | Noted: 2021-04-05 | Resolved: 2021-12-10

## 2021-12-10 PROBLEM — Z86.19 HISTORY OF WOUND INFECTION: Status: RESOLVED | Noted: 2021-04-05 | Resolved: 2021-12-10

## 2021-12-10 PROBLEM — Z36.89 ENCOUNTER FOR FETAL ANATOMIC SURVEY: Status: RESOLVED | Noted: 2021-06-10 | Resolved: 2021-12-10

## 2021-12-10 PROBLEM — Z13.79 GENETIC SCREENING: Status: RESOLVED | Noted: 2021-05-17 | Resolved: 2021-12-10

## 2021-12-10 PROBLEM — Z34.90 PREGNANCY: Status: RESOLVED | Noted: 2021-04-05 | Resolved: 2021-12-10

## 2021-12-10 LAB
BILIRUB BLD-MCNC: NEGATIVE MG/DL
CLARITY, POC: CLEAR
COLOR UR: YELLOW
GLUCOSE UR STRIP-MCNC: NEGATIVE MG/DL
KETONES UR QL: NEGATIVE
LEUKOCYTE EST, POC: ABNORMAL
NITRITE UR-MCNC: NEGATIVE MG/ML
PH UR: 6 [PH] (ref 5–8)
PROT UR STRIP-MCNC: NEGATIVE MG/DL
RBC # UR STRIP: ABNORMAL /UL
SP GR UR: 1.02 (ref 1–1.03)
UROBILINOGEN UR QL: NORMAL

## 2021-12-10 PROCEDURE — 99024 POSTOP FOLLOW-UP VISIT: CPT | Performed by: OBSTETRICS & GYNECOLOGY

## 2021-12-10 NOTE — PROGRESS NOTES
"[facility]   Obstetrics and Gynecology   Postop  Visit    12/10/2021    Patient: Saima Rausch          MR#:[id number]    History of Present Illness    36 y.o. female  status post  delivery   Patient presents visit without complaints  ________________________________________  Patient Active Problem List   Diagnosis   • Pregnancy   • Herpes, vulvar   • History of wound infection   • Previous  section   • Maternal anemia in pregnancy, antepartum   • Obesity in pregnancy, antepartum   • Genetic screening   • Incomplete anatomic survey    • Request for sterilization   • COVID-19 affecting pregnancy in third trimester   • Supervision of other normal pregnancy   • Herpes simplex virus type 2 (HSV-2) infection affecting pregnancy in third trimester   • S/P  section       Past Medical History:   Diagnosis Date   • HSV-2 (herpes simplex virus 2) infection     first outbreak not during pregnancy   • Wound infection     wound infection after 1st  and had to be opened back up       Past Surgical History:   Procedure Laterality Date   • APPENDECTOMY     •  SECTION     •  SECTION WITH TUBAL N/A 10/30/2021    Procedure:  SECTION REPEAT WITH TUBAL;  Surgeon: Abdulkadir Thompson MD;  Location: [facility] LABOR DELIVERY;  Service: Obstetrics/Gynecology;  Laterality: N/A;   • CHOLECYSTECTOMY         Social History     Tobacco Use   Smoking Status Never Smoker   Smokeless Tobacco Never Used       has a current medication list which includes the following prescription(s): ferrous sulfate, prenatal vitamin 27-0.8, cetirizine hcl, docusate sodium, ibuprofen, and valacyclovir.  ________________________________________  Review of Systems   Genitourinary: Positive for vaginal bleeding.            Objective       /74   Ht 167.6 cm (66\")   Wt 120 kg (264 lb)   LMP 2021 (Exact Date)   Breastfeeding Yes   BMI 42.61 kg/m²    BP Readings from Last 3 " "Encounters:   12/10/21 124/74   21 122/70   21 113/70      Wt Readings from Last 3 Encounters:   12/10/21 120 kg (264 lb)   21 122 kg (270 lb)   10/30/21 127 kg (281 lb)      BMI: Estimated body mass index is 42.61 kg/m² as calculated from the following:    Height as of this encounter: 167.6 cm (66\").    Weight as of this encounter: 120 kg (264 lb).    EXAM     General:     Patient appears well in NAD  Abdomen: Soft, NT, +BS, no acute findings  Pelvic:  Scant lochia  Incision: Dry, clean, intact healing without signs of infection  Ext:  No cyanosis, edema 1-2    Assessment:  Diagnoses and all orders for this visit:    1. Screening for blood or protein in urine (Primary)  -     POC Urinalysis Dipstick            Plan:  Return for Annual exam     Abdulkadir Thompson MD  12/10/2021 11:04 EST  ________________________________________________________________  Delivery Details     Delivery: , Low Transverse     YOB: 2019    Time of Birth: 10:27 AM      Anesthesia: Spinal     Delivering clinician: Abdulkadir Thompson      Infant    Findings: male  infant     Infant observations: Weight: 3370 g (7 lb 6.9 oz)     Observations/Comments:  Arash 3      Apgars: 9  @ 1 minute /    9  @ 5 minutes         Estimated Blood Loss:   cc.     "

## (undated) DEVICE — GLV SURG BIOGEL LTX PF 7 1/2

## (undated) DEVICE — ANTIBACTERIAL UNDYED BRAIDED (POLYGLACTIN 910), SYNTHETIC ABSORBABLE SUTURE: Brand: COATED VICRYL

## (undated) DEVICE — SUT MNCRYL 0/0 CTX 36IN Y398H

## (undated) DEVICE — SOL IRR H2O BTL 1000ML STRL

## (undated) DEVICE — SLV SCD KN ADJ EXPRSS LG

## (undated) DEVICE — KT ART BLD GAS QUICK DRAW

## (undated) DEVICE — SUT MNCRYL PLS ANTIB UD 4/0 SH 27IN

## (undated) DEVICE — 3M(TM) TEGADERM(TM) TRANSPARENT FILM DRESSING FRAME STYLE 1627: Brand: 3M™ TEGADERM™